# Patient Record
Sex: FEMALE | Race: WHITE | Employment: OTHER | URBAN - METROPOLITAN AREA
[De-identification: names, ages, dates, MRNs, and addresses within clinical notes are randomized per-mention and may not be internally consistent; named-entity substitution may affect disease eponyms.]

---

## 2017-01-16 ENCOUNTER — ANTI-COAG VISIT (OUTPATIENT)
Dept: CARDIOLOGY CLINIC | Age: 82
End: 2017-01-16

## 2017-01-16 DIAGNOSIS — I99.8 ISCHEMIC FINGER: ICD-10-CM

## 2017-01-16 DIAGNOSIS — Z79.01 LONG TERM CURRENT USE OF ANTICOAGULANT THERAPY: Primary | ICD-10-CM

## 2017-01-16 LAB
INR BLD: 2.7
PT POC: 32.3 SEC
VALID INTERNAL CONTROL?: YES

## 2017-01-16 NOTE — PROGRESS NOTES
Verbal order and read back per Dr. Navdeep Wagner  The INR is stable and therapeutic. Continue same dose of coumadin and recheck in 1 month.   Continue Coumadin 5mg daily except 2.5mg on Tues, Thur, and Sat   Patient informed of instructions, read back and verbalized understanding Dandy Dominguez LPN

## 2017-02-16 ENCOUNTER — ANTI-COAG VISIT (OUTPATIENT)
Dept: CARDIOLOGY CLINIC | Age: 82
End: 2017-02-16

## 2017-02-16 DIAGNOSIS — I99.8 ISCHEMIC FINGER: ICD-10-CM

## 2017-02-16 DIAGNOSIS — Z79.01 LONG TERM CURRENT USE OF ANTICOAGULANT THERAPY: Primary | ICD-10-CM

## 2017-02-16 LAB
INR BLD: 2
INR BLD: NORMAL
PT POC: NORMAL SEC
PT POC: NORMAL SEC
VALID INTERNAL CONTROL?: YES
VALID INTERNAL CONTROL?: YES

## 2017-02-16 NOTE — PROGRESS NOTES
Verbal order and read back per Christi Reaves NP  The INR is stable and therapeutic. Continue same dose of coumadin and recheck in 1 month.   Continue Coumadin 5mg daily except 2.5mg on Tues, Thur, and Sat   Patient informed of instructions, read back and verbalized understanding Armand Coats LPN

## 2017-03-16 ENCOUNTER — ANTI-COAG VISIT (OUTPATIENT)
Dept: CARDIOLOGY CLINIC | Age: 82
End: 2017-03-16

## 2017-03-16 DIAGNOSIS — I99.8 ISCHEMIC FINGER: ICD-10-CM

## 2017-03-16 DIAGNOSIS — Z79.01 LONG TERM CURRENT USE OF ANTICOAGULANT THERAPY: Primary | ICD-10-CM

## 2017-03-16 LAB
INR BLD: 5.9
PT POC: 71 SEC
VALID INTERNAL CONTROL?: YES

## 2017-03-16 NOTE — PROGRESS NOTES
Verbal order and read back per Veronica Lopez NP  The INR is above the therapeutic range. Ask the patient about bleeding complications. Please make the following adjustments to Coumadin dosing: Hold X 2 then Continue Coumadin 5mg daily except 2.5mg on Tues, Thur, and Sat Repeat the INR in 1 week. Patient states she may not be able to make appointment next week due to family issue. She will call the office if she has to reschedule.    Patient informed of instructions, read back and verbalized understanding Yael Zhang LPN

## 2017-03-22 ENCOUNTER — ANTI-COAG VISIT (OUTPATIENT)
Dept: CARDIOLOGY CLINIC | Age: 82
End: 2017-03-22

## 2017-03-22 DIAGNOSIS — I99.8 ISCHEMIC FINGER: ICD-10-CM

## 2017-03-22 DIAGNOSIS — Z79.01 LONG TERM CURRENT USE OF ANTICOAGULANT THERAPY: ICD-10-CM

## 2017-03-22 LAB
INR BLD: 2.5
PT POC: 30.5 SEC
VALID INTERNAL CONTROL?: YES

## 2017-03-22 NOTE — PROGRESS NOTES
The INR is stable and therapeutic.    Continue Coumadin 5mg daily except 2.5mg on Tues, Thur, and Sat   Recheck INR in 4 weeks

## 2017-04-19 ENCOUNTER — ANTI-COAG VISIT (OUTPATIENT)
Dept: CARDIOLOGY CLINIC | Age: 82
End: 2017-04-19

## 2017-04-19 DIAGNOSIS — I99.8 ISCHEMIC FINGER: ICD-10-CM

## 2017-04-19 DIAGNOSIS — Z79.01 LONG TERM CURRENT USE OF ANTICOAGULANT THERAPY: ICD-10-CM

## 2017-04-19 LAB
INR BLD: 4.2
PT POC: 50.5 SEC
VALID INTERNAL CONTROL?: YES

## 2017-04-19 NOTE — PROGRESS NOTES
The INR is above the therapeutic range. Ask the patient about bleeding complications.   Please make the following adjustments to Coumadin dosing: Hold x 1 then continue Coumadin 5mg daily except 2.5mg on Tues, Thur, and Sat   Repeat the INR in 4 weeks

## 2017-05-17 ENCOUNTER — ANTI-COAG VISIT (OUTPATIENT)
Dept: CARDIOLOGY CLINIC | Age: 82
End: 2017-05-17

## 2017-05-17 DIAGNOSIS — Z79.01 LONG TERM CURRENT USE OF ANTICOAGULANT THERAPY: ICD-10-CM

## 2017-05-17 DIAGNOSIS — I99.8 ISCHEMIC FINGER: ICD-10-CM

## 2017-05-17 LAB
INR BLD: 2.6
PT POC: 30.9 SECONDS
VALID INTERNAL CONTROL?: YES

## 2017-06-21 ENCOUNTER — ANTI-COAG VISIT (OUTPATIENT)
Dept: CARDIOLOGY CLINIC | Age: 82
End: 2017-06-21

## 2017-06-21 DIAGNOSIS — I99.8 ISCHEMIC FINGER: ICD-10-CM

## 2017-06-21 DIAGNOSIS — Z79.01 LONG TERM CURRENT USE OF ANTICOAGULANT THERAPY: ICD-10-CM

## 2017-06-21 LAB
INR BLD: 2.5
PT POC: 29.9 SECONDS
VALID INTERNAL CONTROL?: YES

## 2017-06-21 NOTE — PROGRESS NOTES
The INR is stable and therapeutic.   Continue Coumadin 5mg daily except 2.5mg on Tues, Thur, and Sat   Recheck INR in 5 weeks

## 2017-07-25 ENCOUNTER — ANTI-COAG VISIT (OUTPATIENT)
Dept: CARDIOLOGY CLINIC | Age: 82
End: 2017-07-25

## 2017-07-25 DIAGNOSIS — I99.8 ISCHEMIC FINGER: Primary | ICD-10-CM

## 2017-07-25 DIAGNOSIS — Z79.01 LONG TERM CURRENT USE OF ANTICOAGULANT THERAPY: ICD-10-CM

## 2017-07-25 LAB
INR BLD: 2.7
PT POC: 32.2 SECONDS
VALID INTERNAL CONTROL?: YES

## 2017-07-25 NOTE — PROGRESS NOTES
Verbal order and read back per Jojo Nix NP  The INR is stable and therapeutic.  Continue same dose of coumadin and recheck in 3 weeks  Continue Coumadin 5mg daily except 2.5mg on Tues, Thur, and Sat   Patient informed of instructions, read back and verbalized understanding Toby Spears LPN

## 2017-08-17 ENCOUNTER — ANTI-COAG VISIT (OUTPATIENT)
Dept: CARDIOLOGY CLINIC | Age: 82
End: 2017-08-17

## 2017-08-17 ENCOUNTER — OFFICE VISIT (OUTPATIENT)
Dept: CARDIOLOGY CLINIC | Age: 82
End: 2017-08-17

## 2017-08-17 VITALS
WEIGHT: 134 LBS | BODY MASS INDEX: 23.74 KG/M2 | OXYGEN SATURATION: 98 % | HEIGHT: 63 IN | HEART RATE: 81 BPM | DIASTOLIC BLOOD PRESSURE: 70 MMHG | SYSTOLIC BLOOD PRESSURE: 120 MMHG

## 2017-08-17 DIAGNOSIS — I99.8 ISCHEMIC FINGER: ICD-10-CM

## 2017-08-17 DIAGNOSIS — Z79.01 LONG TERM CURRENT USE OF ANTICOAGULANT THERAPY: ICD-10-CM

## 2017-08-17 DIAGNOSIS — I10 ESSENTIAL HYPERTENSION WITH GOAL BLOOD PRESSURE LESS THAN 130/80: ICD-10-CM

## 2017-08-17 DIAGNOSIS — Z45.09 ENCOUNTER FOR LOOP RECORDER CHECK: Primary | ICD-10-CM

## 2017-08-17 DIAGNOSIS — Z79.01 LONG TERM CURRENT USE OF ANTICOAGULANT THERAPY: Primary | ICD-10-CM

## 2017-08-17 LAB
INR BLD: 2.5
PT POC: 30.1 SECONDS
VALID INTERNAL CONTROL?: YES

## 2017-08-17 RX ORDER — WARFARIN SODIUM 5 MG/1
5 TABLET ORAL DAILY
Qty: 90 TAB | Refills: 3 | Status: SHIPPED | OUTPATIENT
Start: 2017-08-17 | End: 2018-08-16 | Stop reason: SDUPTHER

## 2017-08-17 NOTE — PROGRESS NOTES
Verbal order and read back per Renetta Rule NP  The INR is stable and therapeutic. Continue same dose of coumadin and recheck in 1 month.   Continue Coumadin 5mg daily except 2.5mg on Tues, Thur, and Sat   Patient informed of instructions, read back and verbalized understanding Cony Venegas LPN

## 2017-08-17 NOTE — PROGRESS NOTES
1. Have you been to the ER, urgent care clinic since your last visit? Hospitalized since your last visit? No     2. Have you seen or consulted any other health care providers outside of the [address] [place] [address] since your last visit? Include any pap smears or colon screening.  No

## 2017-08-17 NOTE — MR AVS SNAPSHOT
Visit Information Date & Time Provider Department Dept. Phone Encounter #  
 8/17/2017 10:00 AM Clinton Herrera MD [facility] [address] [id number] Your Appointments 9/14/2017  9:40 AM  
ANTICOAG NURSE with Pt Inr Hv [facility] ([facility]) Appt Note: 4 week INR  
 Sushila Isidro Getting [id number] [phone] [address] [place] [address] [address] Upcoming Health Maintenance Date Due DTaP/Tdap/Td series (1 - Tdap) 12/6/1956 ZOSTER VACCINE AGE 60> 10/6/1995 GLAUCOMA SCREENING Q2Y 12/6/2000 OSTEOPOROSIS SCREENING (DEXA) 12/6/2000 Pneumococcal 65+ Low/Medium Risk (1 of 2 - PCV13) 12/6/2000 MEDICARE YEARLY EXAM 12/6/2000 INFLUENZA AGE 9 TO ADULT 8/1/2017 Allergies as of 8/17/2017  Review Complete On: 8/17/2017 By: Clinton Herrera MD  
  
 Severity Noted Reaction Type Reactions Iodine Medium 07/08/2015   Systemic Unknown (comments) Gets flushed and hot Codeine  04/24/2015    Other (comments) Gets hyper Current Immunizations  Never Reviewed No immunizations on file. Not reviewed this visit You Were Diagnosed With   
  
 Codes Comments Long term current use of anticoagulant therapy    -  Primary ICD-10-CM: Z79.01 
ICD-9-CM: V58.61 Ischemic finger     ICD-10-CM: I99.8 ICD-9-CM: 459.9 Essential hypertension with goal blood pressure less than 130/80     ICD-10-CM: I10 
ICD-9-CM: 401.9 Encounter for loop recorder check     ICD-10-CM: Z45.09 
ICD-9-CM: V53.39 Vitals BP Pulse Height(growth percentile) Weight(growth percentile) SpO2 BMI  
 120/70 81 5' 3\" (1.6 m) 134 lb (60.8 kg) 98% 23.74 kg/m2 Smoking Status Never Smoker Vitals History BMI and BSA Data Body Mass Index Body Surface Area  
 23.74 kg/m 2 1.64 m 2 Preferred Pharmacy Pharmacy Name Phone [phone], [address] AT [address] [phone] Your Updated Medication List  
  
   
This list is accurate as of: 8/17/17 11:05 AM.  Always use your most recent med list.  
  
  
  
  
 aspirin 81 mg chewable tablet Take 81 mg by mouth daily. hydroCHLOROthiazide 25 mg tablet Commonly known as:  HYDRODIURIL Take 1 Tab by mouth daily. lisinopril 40 mg tablet Commonly known as:  Lollie Jump Take 1 Tab by mouth daily. meclizine 25 mg chewable tablet Commonly known as:  ANTIVERT Take 25 mg by mouth three (3) times daily as needed. warfarin 5 mg tablet Commonly known as:  COUMADIN Take 1 Tab by mouth daily. We Performed the Following AMB POC EKG ROUTINE W/ 12 LEADS, INTER & REP [79712 CPT(R)] Patient Instructions Please follow up in 12 months Introducing [facility]! [facility] introduces Shoot it! patient portal. Now you can access parts of your medical record, email your doctor's office, and request medication refills online. 1. In your internet browser, go to [email or web address] 2. Click on the First Time User? Click Here link in the Sign In box. You will see the New Member Sign Up page. 3. Enter your Shoot it! Access Code exactly as it appears below. You will not need to use this code after youve completed the sign-up process. If you do not sign up before the expiration date, you must request a new code. · Shoot it! Access Code: [email or web address] Expires: 9/19/2017 10:03 AM 
 
4. Enter the last four digits of your Social Security Number (xxxx) and Date of Birth (mm/dd/yyyy) as indicated and click Submit. You will be taken to the next sign-up page. 5. Create a Ynsectt ID. This will be your Ynsectt login ID and cannot be changed, so think of one that is secure and easy to remember. 6. Create a LifeVantage password. You can change your password at any time. 7. Enter your Password Reset Question and Answer. This can be used at a later time if you forget your password. 8. Enter your e-mail address. You will receive e-mail notification when new information is available in [address] 9. Click Sign Up. You can now view and download portions of your medical record. 10. Click the Download Summary menu link to download a portable copy of your medical information. If you have questions, please visit the Frequently Asked Questions section of the LifeVantage website. Remember, LifeVantage is NOT to be used for urgent needs. For medical emergencies, dial 911. Now available from your iPhone and Android! Please provide this summary of care documentation to your next provider. Your primary care clinician is listed as Lupe Lee. If you have any questions after today's visit, please call [phone].

## 2017-09-14 ENCOUNTER — ANTI-COAG VISIT (OUTPATIENT)
Dept: CARDIOLOGY CLINIC | Age: 82
End: 2017-09-14

## 2017-09-14 DIAGNOSIS — I99.8 ISCHEMIC FINGER: ICD-10-CM

## 2017-09-14 DIAGNOSIS — Z79.01 LONG TERM CURRENT USE OF ANTICOAGULANT THERAPY: Primary | ICD-10-CM

## 2017-09-14 LAB
INR BLD: 2.1
PT POC: 25.2 SECONDS
VALID INTERNAL CONTROL?: YES

## 2017-09-14 NOTE — PROGRESS NOTES
Verbal order and read back per Charlotte Kinney NP  The INR is stable and therapeutic. Continue same dose of coumadin and recheck in 1 month.   Continue Coumadin 5mg daily except 2.5mg on Tues, Thur, and Sat   Patient informed of instructions, read back and verbalized understanding Vic Arevalo LPN

## 2017-10-12 ENCOUNTER — ANTI-COAG VISIT (OUTPATIENT)
Dept: CARDIOLOGY CLINIC | Age: 82
End: 2017-10-12

## 2017-10-12 DIAGNOSIS — Z79.01 LONG TERM CURRENT USE OF ANTICOAGULANT THERAPY: ICD-10-CM

## 2017-10-12 DIAGNOSIS — I99.8 ISCHEMIC FINGER: ICD-10-CM

## 2017-10-12 LAB
INR BLD: 2.7
PT POC: 32.1 SECONDS
VALID INTERNAL CONTROL?: YES

## 2017-10-12 NOTE — PROGRESS NOTES
Verbal order and read back per Lucy Stage NP  The INR is stable and therapeutic. Continue same dose of coumadin and recheck in 1 month.   Continue Coumadin 5mg daily except 2.5mg on Tues, Thur, and Sat   Patient informed of instructions, read back and verbalized understanding Isis Marquis LPN

## 2017-11-15 RX ORDER — LISINOPRIL 40 MG/1
TABLET ORAL
Qty: 90 TAB | Refills: 0 | Status: SHIPPED | OUTPATIENT
Start: 2017-11-15 | End: 2018-02-11 | Stop reason: SDUPTHER

## 2017-11-15 RX ORDER — HYDROCHLOROTHIAZIDE 25 MG/1
TABLET ORAL
Qty: 90 TAB | Refills: 0 | Status: SHIPPED | OUTPATIENT
Start: 2017-11-15 | End: 2018-02-11 | Stop reason: SDUPTHER

## 2017-11-16 ENCOUNTER — ANTI-COAG VISIT (OUTPATIENT)
Dept: CARDIOLOGY CLINIC | Age: 82
End: 2017-11-16

## 2017-11-16 DIAGNOSIS — I99.8 ISCHEMIC FINGER: Primary | ICD-10-CM

## 2017-11-16 DIAGNOSIS — Z79.01 LONG TERM CURRENT USE OF ANTICOAGULANT THERAPY: ICD-10-CM

## 2017-11-16 LAB
INR BLD: 3.4
PT POC: 40.7 SECONDS
VALID INTERNAL CONTROL?: YES

## 2017-11-16 NOTE — PROGRESS NOTES
Verbal order and read back per Amada Wright NP  The INR is above the therapeutic range. Ask the patient about bleeding complications. Please make the following adjustments to Coumadin dosing: Hold X 1 then Continue Coumadin 5mg daily except 2.5mg on Tues, Thur, and Sat Repeat the INR in 4 weeks (patient request to come back in 1 month).   Patient informed of instructions, read back and verbalized understanding Thea Cota LPN

## 2017-12-14 ENCOUNTER — ANTI-COAG VISIT (OUTPATIENT)
Dept: CARDIOLOGY CLINIC | Age: 82
End: 2017-12-14

## 2017-12-14 DIAGNOSIS — I99.8 ISCHEMIC FINGER: Primary | ICD-10-CM

## 2017-12-14 DIAGNOSIS — Z79.01 LONG TERM CURRENT USE OF ANTICOAGULANT THERAPY: ICD-10-CM

## 2017-12-14 LAB
INR BLD: 2
PT POC: 23.5 SECONDS
VALID INTERNAL CONTROL?: YES

## 2018-01-03 ENCOUNTER — OFFICE VISIT (OUTPATIENT)
Dept: CARDIOLOGY CLINIC | Age: 83
End: 2018-01-03

## 2018-01-03 DIAGNOSIS — Z45.09 ENCOUNTER FOR LOOP RECORDER CHECK: ICD-10-CM

## 2018-01-03 DIAGNOSIS — I99.8 ISCHEMIC FINGER: Primary | ICD-10-CM

## 2018-01-12 ENCOUNTER — ANTI-COAG VISIT (OUTPATIENT)
Dept: CARDIOLOGY CLINIC | Age: 83
End: 2018-01-12

## 2018-01-12 DIAGNOSIS — I99.8 ISCHEMIC FINGER: ICD-10-CM

## 2018-01-12 DIAGNOSIS — Z79.01 LONG TERM CURRENT USE OF ANTICOAGULANT THERAPY: ICD-10-CM

## 2018-01-12 LAB
INR BLD: 2.5
PT POC: 29.9 SECONDS
VALID INTERNAL CONTROL?: YES

## 2018-01-12 NOTE — PROGRESS NOTES
Pt is in range. patient will continue current medication dosage. patient will be see in 4 weeks. Patient was given calendar and appointment   Patient will be trying powder supplement instead of vegetables, since it is more convenient for her. Patient is eating vegetables now about 3 times a week, she will be replacing one meal from vegetables with powder and will see how it will affect her INR level in 4 weeks.

## 2018-02-09 ENCOUNTER — ANTI-COAG VISIT (OUTPATIENT)
Dept: CARDIOLOGY CLINIC | Age: 83
End: 2018-02-09

## 2018-02-09 ENCOUNTER — TELEPHONE (OUTPATIENT)
Dept: CARDIOLOGY CLINIC | Age: 83
End: 2018-02-09

## 2018-02-09 DIAGNOSIS — I99.8 ISCHEMIC FINGER: Primary | ICD-10-CM

## 2018-02-09 DIAGNOSIS — Z79.01 LONG TERM CURRENT USE OF ANTICOAGULANT THERAPY: ICD-10-CM

## 2018-02-09 LAB
INR BLD: 2.5
PT POC: 29.5 SECONDS
VALID INTERNAL CONTROL?: YES

## 2018-02-09 NOTE — PROGRESS NOTES
Verbal order and read back per Cas De Guzman NP  The INR is stable and therapeutic. Continue same dose of coumadin and recheck in 1 month.   Continue Coumadin 5mg daily except 2.5mg on Tues, Thur, and Sat   Patient informed of instructions, read back and verbalized understanding Telly Viera LPN

## 2018-02-09 NOTE — TELEPHONE ENCOUNTER
Patient is on coumadin and requires a dental extraction. Can patient hold coumadin and for how many days? Should she bridge with Lovenox? Progress Notes      History of Present Illness:   An 81 y.o. female here for follow up. Aravind Never has an  implantable loop recorder. 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  She has a history of hypertension and dyslipidemia.  She is on Coumadin without any bleeding issues.  She denies any chest pain, dyspnea, presyncope, syncope, PND, orthopnea or edema.       Her major issue is worsening vertigo which clearly appears to be positional.  She recently had a balance test and is scheduled to see ENT.     Impression:   Significant vertigo/balance issues, scheduled to see ENT. Loop recorder has not shown arrhythmias to cause these symptoms. 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 Implantable loop record 7/2015 to monitor for arrhythmias and without any events. Hypertension. Dyslipidemia.      The underlying etiology of her embolic event is still unclear but she remains on Coumadin. Given the chance of recurrence and that of stroke,  I would continue it indefinitely. Her device does not show any evidence of atrial fibrillation. She has had some tachy episodes but nothing sustained. I will tentatively see her back in one year.           Past Medical History:   Diagnosis Date    History of echocardiogram 04/25/2015     EF 55-60%. No WMA. Gr 1 DDfx. RVSP 30-35 mmHg. Mild-mod TR. 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       Vertigo

## 2018-02-09 NOTE — LETTER
2018 8:57 AM 
 
Ms. Ericka Ndiaye [address] [place] [id number]  
 1935 Ericka Ndiaye can hold coumadin for 5 days prior to dental extraction . Please feel free to contact our office if you have any questions regarding this patient. Sincerely,       MD Michael Coronel MD

## 2018-02-11 RX ORDER — LISINOPRIL 40 MG/1
TABLET ORAL
Qty: 90 TAB | Refills: 0 | Status: SHIPPED | OUTPATIENT
Start: 2018-02-11 | End: 2018-05-11 | Stop reason: SDUPTHER

## 2018-02-11 RX ORDER — HYDROCHLOROTHIAZIDE 25 MG/1
TABLET ORAL
Qty: 90 TAB | Refills: 0 | Status: SHIPPED | OUTPATIENT
Start: 2018-02-11 | End: 2018-05-11 | Stop reason: SDUPTHER

## 2018-03-07 ENCOUNTER — ANTI-COAG VISIT (OUTPATIENT)
Dept: CARDIOLOGY CLINIC | Age: 83
End: 2018-03-07

## 2018-03-07 DIAGNOSIS — I99.8 ISCHEMIC FINGER: ICD-10-CM

## 2018-03-07 DIAGNOSIS — Z79.01 LONG TERM CURRENT USE OF ANTICOAGULANT THERAPY: ICD-10-CM

## 2018-03-07 LAB
INR BLD: 2.1
PT POC: 25 SECONDS
VALID INTERNAL CONTROL?: YES

## 2018-04-04 ENCOUNTER — ANTI-COAG VISIT (OUTPATIENT)
Dept: CARDIOLOGY CLINIC | Age: 83
End: 2018-04-04

## 2018-04-04 DIAGNOSIS — Z79.01 LONG TERM CURRENT USE OF ANTICOAGULANT THERAPY: ICD-10-CM

## 2018-04-04 DIAGNOSIS — I99.8 ISCHEMIC FINGER: ICD-10-CM

## 2018-04-04 LAB
INR BLD: 3.3
PT POC: 29.8 SECONDS
VALID INTERNAL CONTROL?: YES

## 2018-04-04 NOTE — PROGRESS NOTES
The INR is above the therapeutic range. Ask the patient about bleeding complications.   Please make the following adjustments to Coumadin dosing: Hold today, 2.5mg on 4/6/18 and then Continue Coumadin 5mg daily except 2.5mg on Tues, Thur, and Sat  Repeat the INR in 3 weeks

## 2018-04-11 ENCOUNTER — OFFICE VISIT (OUTPATIENT)
Dept: CARDIOLOGY CLINIC | Age: 83
End: 2018-04-11

## 2018-04-11 DIAGNOSIS — Z45.09 ENCOUNTER FOR LOOP RECORDER CHECK: ICD-10-CM

## 2018-04-11 DIAGNOSIS — I99.8 ISCHEMIC FINGER: Primary | ICD-10-CM

## 2018-04-18 PROBLEM — Z45.09 ENCOUNTER FOR LOOP RECORDER CHECK: Status: ACTIVE | Noted: 2018-04-18

## 2018-04-25 ENCOUNTER — ANTI-COAG VISIT (OUTPATIENT)
Dept: CARDIOLOGY CLINIC | Age: 83
End: 2018-04-25

## 2018-04-25 DIAGNOSIS — Z79.01 LONG TERM CURRENT USE OF ANTICOAGULANT THERAPY: ICD-10-CM

## 2018-04-25 DIAGNOSIS — I99.8 ISCHEMIC FINGER: ICD-10-CM

## 2018-04-25 LAB
INR BLD: 3.3
PT POC: 39.3 SECONDS
VALID INTERNAL CONTROL?: YES

## 2018-04-25 NOTE — PROGRESS NOTES
The INR is above the therapeutic range. Ask the patient about bleeding complications.   Please make the following adjustments to Coumadin dosing: Hold today then Continue Coumadin 5mg daily except 2.5mg on Tues, Thur, and Sat   Repeat the INR in 4 weeks

## 2018-05-12 RX ORDER — LISINOPRIL 40 MG/1
TABLET ORAL
Qty: 90 TAB | Refills: 0 | Status: SHIPPED | OUTPATIENT
Start: 2018-05-12 | End: 2018-08-16 | Stop reason: SDUPTHER

## 2018-05-12 RX ORDER — HYDROCHLOROTHIAZIDE 25 MG/1
TABLET ORAL
Qty: 90 TAB | Refills: 0 | Status: SHIPPED | OUTPATIENT
Start: 2018-05-12 | End: 2018-08-16 | Stop reason: SDUPTHER

## 2018-05-23 ENCOUNTER — ANTI-COAG VISIT (OUTPATIENT)
Dept: CARDIOLOGY CLINIC | Age: 83
End: 2018-05-23

## 2018-05-23 DIAGNOSIS — I99.8 ISCHEMIC FINGER: ICD-10-CM

## 2018-05-23 DIAGNOSIS — Z79.01 LONG TERM CURRENT USE OF ANTICOAGULANT THERAPY: ICD-10-CM

## 2018-05-23 LAB
INR BLD: 1.9
PT POC: 22.5 SECONDS
VALID INTERNAL CONTROL?: YES

## 2018-05-23 RX ORDER — DICLOFENAC SODIUM 10 MG/G
GEL TOPICAL 4 TIMES DAILY
COMMUNITY
End: 2018-11-05 | Stop reason: ALTCHOICE

## 2018-05-23 NOTE — PROGRESS NOTES
The INR is below the therapeutic range.   Please make the following adjustments to Coumadin dosin.5mg x 1 then Continue Coumadin 5mg daily except 2.5mg on Tues, Thur, and Sat   Repeat the INR in 4 weeks

## 2018-06-20 ENCOUNTER — ANTI-COAG VISIT (OUTPATIENT)
Dept: CARDIOLOGY CLINIC | Age: 83
End: 2018-06-20

## 2018-06-20 DIAGNOSIS — Z79.01 LONG TERM CURRENT USE OF ANTICOAGULANT THERAPY: ICD-10-CM

## 2018-06-20 DIAGNOSIS — I99.8 ISCHEMIC FINGER: ICD-10-CM

## 2018-06-20 LAB
INR BLD: 3.8
PT POC: 45.3 SECONDS
VALID INTERNAL CONTROL?: YES

## 2018-07-11 ENCOUNTER — ANTI-COAG VISIT (OUTPATIENT)
Dept: CARDIOLOGY CLINIC | Age: 83
End: 2018-07-11

## 2018-07-11 DIAGNOSIS — Z79.01 LONG TERM CURRENT USE OF ANTICOAGULANT THERAPY: ICD-10-CM

## 2018-07-11 DIAGNOSIS — I99.8 ISCHEMIC FINGER: ICD-10-CM

## 2018-07-11 LAB
INR BLD: 2.1
PT POC: 25 SECONDS
VALID INTERNAL CONTROL?: YES

## 2018-07-11 NOTE — PROGRESS NOTES
Verbal order and read back per  Talia Cota, NP  Patient is within therapeutic range  Continue Coumadin 5mg daily except 2.5mg on Tues, Thur, and Sat   Recheck with seutter appt on 8/16  This has been fully explained to the patient, who indicates understanding.

## 2018-08-16 ENCOUNTER — OFFICE VISIT (OUTPATIENT)
Dept: CARDIOLOGY CLINIC | Age: 83
End: 2018-08-16

## 2018-08-16 ENCOUNTER — ANTI-COAG VISIT (OUTPATIENT)
Dept: CARDIOLOGY CLINIC | Age: 83
End: 2018-08-16

## 2018-08-16 ENCOUNTER — CLINICAL SUPPORT (OUTPATIENT)
Dept: CARDIOLOGY CLINIC | Age: 83
End: 2018-08-16

## 2018-08-16 VITALS
WEIGHT: 132 LBS | HEART RATE: 84 BPM | SYSTOLIC BLOOD PRESSURE: 128 MMHG | OXYGEN SATURATION: 95 % | BODY MASS INDEX: 23.39 KG/M2 | HEIGHT: 63 IN | DIASTOLIC BLOOD PRESSURE: 72 MMHG

## 2018-08-16 DIAGNOSIS — I99.8 ISCHEMIC FINGER: Primary | ICD-10-CM

## 2018-08-16 DIAGNOSIS — I10 ESSENTIAL HYPERTENSION WITH GOAL BLOOD PRESSURE LESS THAN 130/80: ICD-10-CM

## 2018-08-16 DIAGNOSIS — Z79.01 LONG TERM CURRENT USE OF ANTICOAGULANT THERAPY: ICD-10-CM

## 2018-08-16 DIAGNOSIS — Z45.09 ENCOUNTER FOR LOOP RECORDER CHECK: ICD-10-CM

## 2018-08-16 DIAGNOSIS — I99.8 ISCHEMIC FINGER: ICD-10-CM

## 2018-08-16 DIAGNOSIS — Z79.01 LONG TERM CURRENT USE OF ANTICOAGULANT THERAPY: Primary | ICD-10-CM

## 2018-08-16 DIAGNOSIS — Z45.09 ENCOUNTER FOR LOOP RECORDER CHECK: Primary | ICD-10-CM

## 2018-08-16 LAB
INR BLD: 3.4
PT POC: 40.8 SECONDS
VALID INTERNAL CONTROL?: YES

## 2018-08-16 RX ORDER — HYDROCHLOROTHIAZIDE 25 MG/1
25 TABLET ORAL DAILY
Qty: 90 TAB | Refills: 3 | Status: SHIPPED | OUTPATIENT
Start: 2018-08-16 | End: 2019-11-05 | Stop reason: SDUPTHER

## 2018-08-16 RX ORDER — LISINOPRIL 40 MG/1
40 TABLET ORAL DAILY
Qty: 90 TAB | Refills: 3 | Status: SHIPPED | OUTPATIENT
Start: 2018-08-16 | End: 2020-10-30

## 2018-08-16 RX ORDER — WARFARIN SODIUM 5 MG/1
5 TABLET ORAL DAILY
Qty: 90 TAB | Refills: 3 | Status: SHIPPED | OUTPATIENT
Start: 2018-08-16 | End: 2019-11-05 | Stop reason: SDUPTHER

## 2018-08-16 NOTE — PROGRESS NOTES
History of Present Illness:  An 80 y. o. female here for follow up. Nam Clayton has an  implantable loop recorder n place for an embolic event to the right finger a few years ago. She has a history of hypertension and dyslipidemia.  She is on Coumadin without any bleeding issues.  She denies any chest pain, dyspnea, presyncope, syncope, PND, orthopnea or edema.  She has significant issues with vertigo which is being seen and treated by ENT. She continues to have some slight discoloration in her toes at times, but no significant pain.       Impression:   Significant vertigo/balance issues seeing ENT. Implantable Loop recorder placed three years ago now at end of service, no documented arrhythmias, but she tells me 25-30 years ago she did have atrial fibrillation at [facility]. 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 Hypertension, controlled. Dyslipidemia. Her extremities still appear a little purple at times and worse when she hangs her feet down, but no associated pain. I am still concerned about a possible blue toe syndrome from Coumadin and cholesterol emboli,but unusual without any pain. She will continue Coumadin for now. All questions answered. I will schedule her for a loop explant if she would like. Past Medical History:   Diagnosis Date    History of echocardiogram 04/25/2015    EF 55-60%. No WMA. Gr 1 DDfx. RVSP 30-35 mmHg. Mild-mod TR. 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  Vertigo        Current Outpatient Prescriptions   Medication Sig Dispense Refill    diclofenac (VOLTAREN) 1 % gel Apply  to affected area four (4) times daily.  hydroCHLOROthiazide (HYDRODIURIL) 25 mg tablet TAKE 1 TABLET BY MOUTH DAILY. 90 Tab 0    lisinopril (PRINIVIL, ZESTRIL) 40 mg tablet TAKE 1 TABLET BY MOUTH DAILY.  90 Tab 0    warfarin (COUMADIN) 5 mg tablet Take 1 Tab by mouth daily. 90 Tab 3    aspirin 81 mg chewable tablet Take 81 mg by mouth daily.  meclizine (ANTIVERT) 25 mg chewable tablet Take 25 mg by mouth three (3) times daily as needed. Social History   reports that she has never smoked. She does not have any smokeless tobacco history on file. reports that she does not drink alcohol. Family History  family history is not on file. Review of Systems  Except as stated above include:  Constitutional: Negative for fever, chills and malaise/fatigue. HEENT: No congestion or recent URI. Gastrointestinal: No nausea, vomiting, abdominal pain, bloody stools. Pulmonary:  Negative except as stated above. Cardiac:  Negative except as stated above. Musculoskeletal: Negative except as stated above. Neurological:  No localized symptoms. Skin:  Negative except as stated above. Psych:  Negative except as stated above. Endocrine:  Negative except as stated above. PHYSICAL EXAM  BP Readings from Last 3 Encounters:   08/16/18 128/72   08/17/17 120/70   08/19/16 126/84     Pulse Readings from Last 3 Encounters:   08/16/18 84   08/17/17 81   08/19/16 66     Wt Readings from Last 3 Encounters:   08/16/18 59.9 kg (132 lb)   08/17/17 60.8 kg (134 lb)   08/19/16 60.8 kg (134 lb)     General:   Well developed, well groomed. Head/Neck:   No jugular venous distention     No carotid bruits. No evidence of xanthelasma. Lungs:   No respiratory distress. Clear bilaterally. Heart:    Regular rate and rhythm. Normal S1/S2. Palpation of heart with normal point of maximum impulse. No significant murmurs, rubs or gallops. Abdomen:   Soft and nontender. No palpable abdominal mass or bruits. Extremities:   Intact peripheral pulses. Purple toes. Neurological:   Alert and oriented to person, place, time. No focal neurological deficit visually.   Skin:   No obvious rash    Blood Pressure Metric:  Sherine Zimmer has been given the following recommendations today due to her elevated BP reading: controlled

## 2018-08-16 NOTE — MR AVS SNAPSHOT
[address] [address] [address] [address] [phone] Patient: Arline Perkins MRN: [id number] Visit Information Date & Time Provider Department Dept. Phone Encounter #  
 2018  1:00 PM Amanda Sarabia MD [facility] [phone] [id number] Upcoming Health Maintenance Date Due DTaP/Tdap/Td series (1 - Tdap) 1956 ZOSTER VACCINE AGE 60> 10/6/1995 GLAUCOMA SCREENING Q2Y 2000 Bone Densitometry (Dexa) Screening 2000 Pneumococcal 65+ Low/Medium Risk (1 of 2 - PCV13) 2000 MEDICARE YEARLY EXAM 3/14/2018 Influenza Age 5 to Adult 2018 Allergies as of 2018  Review Complete On: 2018 By: Amanda Sarabia MD  
  
 Severity Noted Reaction Type Reactions Iodine Medium 2015   Systemic Unknown (comments) Gets flushed and hot Codeine  2015    Other (comments) Gets hyper Current Immunizations  Never Reviewed No immunizations on file. Not reviewed this visit You Were Diagnosed With   
  
 Codes Comments Encounter for loop recorder check    -  Primary ICD-10-CM: Z45.09 
ICD-9-CM: V53.39 Long term current use of anticoagulant therapy     ICD-10-CM: Z79.01 
ICD-9-CM: V58.61 Essential hypertension with goal blood pressure less than 130/80     ICD-10-CM: I10 
ICD-9-CM: 401.9 Vitals BP Pulse Height(growth percentile) Weight(growth percentile) SpO2 BMI  
 128/72 84 5' 3\" (1.6 m) 132 lb (59.9 kg) 95% 23.38 kg/m2 Smoking Status Never Smoker Vitals History BMI and BSA Data Body Mass Index Body Surface Area  
 23.38 kg/m 2 1.63 m 2 Preferred Pharmacy Pharmacy Name Phone [facility] [phone] - [place], [address] AT [address] [phone] Your Updated Medication List  
  
   
 This list is accurate as of 8/16/18  1:39 PM.  Always use your most recent med list.  
  
  
  
  
 aspirin 81 mg chewable tablet Take 81 mg by mouth daily. diclofenac 1 % Gel Commonly known as:  VOLTAREN Apply  to affected area four (4) times daily. hydroCHLOROthiazide 25 mg tablet Commonly known as:  HYDRODIURIL  
TAKE 1 TABLET BY MOUTH DAILY. lisinopril 40 mg tablet Commonly known as:  PRINIVIL, ZESTRIL  
TAKE 1 TABLET BY MOUTH DAILY. meclizine 25 mg chewable tablet Commonly known as:  ANTIVERT Take 25 mg by mouth three (3) times daily as needed. warfarin 5 mg tablet Commonly known as:  COUMADIN Take 1 Tab by mouth daily. Patient Instructions Hold Coumadin dose today, the continue regular dose recheck in 4 weeks Introducing [facility]! [employer] introduces Gigmax patient portal. Now you can access parts of your medical record, email your doctor's office, and request medication refills online. 1. In your internet browser, go to [email or web address] 2. Click on the First Time User? Click Here link in the Sign In box. You will see the New Member Sign Up page. 3. Enter your Gigmax Access Code exactly as it appears below. You will not need to use this code after youve completed the sign-up process. If you do not sign up before the expiration date, you must request a new code. · Gigmax Access Code: [email or web address] Expires: 11/14/2018  1:29 PM 
 
4. Enter the last four digits of your Social Security Number (xxxx) and Date of Birth (mm/dd/yyyy) as indicated and click Submit. You will be taken to the next sign-up page. 5. Create a Gigmax ID. This will be your Gigmax login ID and cannot be changed, so think of one that is secure and easy to remember. 6. Create a Gigmax password. You can change your password at any time. 7. Enter your Password Reset Question and Answer.  This can be used at a later time if you forget your password. 8. Enter your e-mail address. You will receive e-mail notification when new information is available in [address] 9. Click Sign Up. You can now view and download portions of your medical record. 10. Click the Download Summary menu link to download a portable copy of your medical information. If you have questions, please visit the Frequently Asked Questions section of the SeeFuture website. Remember, SeeFuture is NOT to be used for urgent needs. For medical emergencies, dial 911. Now available from your iPhone and Android! Please provide this summary of care documentation to your next provider. Your primary care clinician is listed as Abby Oppenheim. If you have any questions after today's visit, please call [phone].

## 2018-08-16 NOTE — PROGRESS NOTES
1. Have you been to the ER, urgent care clinic since your last visit? Hospitalized since your last visit? No     2. Have you seen or consulted any other health care providers outside of the [address] [place] [address] since your last visit? Include any pap smears or colon screening.  No

## 2018-08-16 NOTE — PROGRESS NOTES
Verbal order and read back per Leslie Wooten NP  Hold today then Continue Coumadin 5mg daily except 2.5mg on Tues, Thur, and Sat   Patient is not with therapeutic range  Recheck 4 weeks  This has been fully explained to the patient and patient's daughter, who indicates understanding.

## 2018-08-27 NOTE — PROGRESS NOTES
I have personally seen and evaluated the device findings. Interrogation reviewed and I agree with assessment.     Aba Castro

## 2018-09-24 ENCOUNTER — TELEPHONE (OUTPATIENT)
Dept: CARDIOLOGY CLINIC | Age: 83
End: 2018-09-24

## 2018-09-24 ENCOUNTER — ANTI-COAG VISIT (OUTPATIENT)
Dept: CARDIOLOGY CLINIC | Age: 83
End: 2018-09-24

## 2018-09-24 DIAGNOSIS — Z79.01 LONG TERM CURRENT USE OF ANTICOAGULANT THERAPY: ICD-10-CM

## 2018-09-24 DIAGNOSIS — I99.8 ISCHEMIC FINGER: ICD-10-CM

## 2018-09-24 LAB
INR BLD: 2.9
PT POC: 35.3 SECONDS
VALID INTERNAL CONTROL?: YES

## 2018-09-24 NOTE — PROGRESS NOTES
Verbal order and read back per Ximena Carmichael DO  Patient is within therapeutic range   Continue Coumadin 5mg daily except 2.5mg on Tues, Thur, and Sat   Recheck 4 weeks  Patient needs to schedule procedure with Dr Natalie Cowan at that time  This has been fully explained to the patient, who indicates understanding.

## 2018-09-24 NOTE — TELEPHONE ENCOUNTER
Patient is planning on scheduling Loop Recorder explant at next INR visit and may be able to get instructions same day. She is apprehensive about the procedure and would like to know if a rx for valium can be written for her to take prior to procedure. Patient takes coumadin, how many days should she hold it for explant?

## 2018-09-26 NOTE — TELEPHONE ENCOUNTER
Yessica Whitney MD   You 20 hours ago (1:39 PM)                 Minimal bleeding with explant but I think she is worried so can hold for 5 days. thx (Routing comment)                        You  Yessica Whitney MD 21 hours ago (12:10 PM)                   What about the coumadin hold? I know it is usually 5 days for a procedure, would this timeframe apply to her as well? (Routing comment)                        Yessica Whitney MD   You Yesterday (7:59 AM)                   Sorry for confusion, can use valium or xanax, not both. (Routing comment)                        Yessica Whitney MD   You Yesterday (7:59 AM)                   Can use valium as well. thx (Routing comment)                        Yessica Whitney MD   You Yesterday (7:58 AM)                   Yes, can give her a Rx for xanax to take the morning of the procedure, just no driving when taking.  thx (Routing comment)

## 2018-10-22 ENCOUNTER — ANTI-COAG VISIT (OUTPATIENT)
Dept: CARDIOLOGY CLINIC | Age: 83
End: 2018-10-22

## 2018-10-22 DIAGNOSIS — I99.8 ISCHEMIC FINGER: ICD-10-CM

## 2018-10-22 DIAGNOSIS — Z79.01 LONG TERM CURRENT USE OF ANTICOAGULANT THERAPY: Primary | ICD-10-CM

## 2018-10-22 LAB
INR BLD: 2.4
PT POC: 28.4 SECONDS
VALID INTERNAL CONTROL?: YES

## 2018-10-22 NOTE — PROGRESS NOTES
Verbal order and read back per La Colmenares NP  Patient is within therapeutic range  Continue Coumadin 5mg daily except 2.5mg on Tues, Thur, and Sat  Patient will be holding coumadin on 11/5-11/7 due to loop explant   This has been fully explained to the patient, who indicates understanding.

## 2018-11-04 NOTE — PROGRESS NOTES
Fawad Soto presents today for a pre-procedure history and physical for loop recorder explant. 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 It is now at end of service. There were no documented arrhythmias while the device was in place. She is an 80year old female with history of embolic event, hypertension, dyslipidemia, significant vertigo (followed by ENT), and she is anticoagulated with Coumadin. She also has history of some discoloration of her toes at times, but no pain. She was last seen by Dr. Pantera Blunt on 8/14/18. Her last echo was done in April 2015 and it showed an EF of 55-60%, no wall motion abnormalities, grade 1 diastolic dysfunction, and RVSP 30 mmHg. She feels well and is slightly anxious about the procedure. Denies chest pain, tightness, heaviness, and palpitations. Denies shortness of breath at rest, dyspnea on exertion, orthopnea and PND. Denies abdominal bloating. Denies lightheadedness, dizziness, and syncope. Denies lower extremity edema and claudication. Denies nausea, vomiting, diarrhea, melena, hematochezia. Denies hematuria, urgency, frequency. Denies fever, chills. PMH:  Past Medical History:   Diagnosis Date    History of echocardiogram 04/25/2015    EF 55-60%. No WMA. Gr 1 DDfx. RVSP 30-35 mmHg. Mild-mod TR. 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  Vertigo        PSH:  No past surgical history on file. MEDS:  Current Outpatient Medications   Medication Sig    hydroCHLOROthiazide (HYDRODIURIL) 25 mg tablet Take 1 Tab by mouth daily.  lisinopril (PRINIVIL, ZESTRIL) 40 mg tablet Take 1 Tab by mouth daily.  warfarin (COUMADIN) 5 mg tablet Take 1 Tab by mouth daily.  aspirin 81 mg chewable tablet Take 81 mg by mouth daily.     meclizine (ANTIVERT) 25 mg chewable tablet Take 25 mg by mouth three (3) times daily as needed.  diazePAM (VALIUM) 5 mg tablet 1 tablet 1 hour prior to procedure. No current facility-administered medications for this visit. Allergies and Sensitivities:  Allergies   Allergen Reactions    Iodine Unknown (comments)     Gets flushed and hot    Codeine Other (comments)     Gets hyper       Family History:  No family history on file. Social History:  She  reports that  has never smoked. She does not have any smokeless tobacco history on file. She  reports that she does not drink alcohol. Physical:  Visit Vitals  /84   Pulse 96   Ht 5' 3\" (1.6 m)   Wt 59.9 kg (132 lb)   SpO2 96%   BMI 23.38 kg/m²         Exam:  Neck:  Supple, no JVD, no carotid bruits  CV:  Normal S1 and  S2, no murmurs, rubs, or gallops noted  Lungs:  Clear to ausculation throughout, no wheezes or rales  Abd:  Soft, non-tender, non-distended with good bowel sounds. No hepatosplenomegaly  Extremities:  No edema      Data:  EKG:   Read by Zia Pitst MD - Sinus rhythm.  Anteroseptal infarct, age undetermined      LABS:  Lab Results   Component Value Date/Time    Sodium 138 07/01/2015 11:52 AM    Potassium 4.3 07/01/2015 11:52 AM    Chloride 101 07/01/2015 11:52 AM    CO2 30 07/01/2015 11:52 AM    Glucose 111 (H) 07/01/2015 11:52 AM    BUN 16 07/01/2015 11:52 AM    Creatinine 1.08 07/01/2015 11:52 AM     Lab Results   Component Value Date/Time    Cholesterol, total 185 04/25/2015 03:10 AM    HDL Cholesterol 60 04/25/2015 03:10 AM    LDL, calculated 104.8 (H) 04/25/2015 03:10 AM    Triglyceride 101 04/25/2015 03:10 AM    CHOL/HDL Ratio 3.1 04/25/2015 03:10 AM     Lab Results   Component Value Date/Time    ALT (SGPT) 35 07/01/2015 11:52 AM         Impression/Plan:  1. Essential hypertension, blood pressure stable  2. Dyslipidemia  3.  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   [employer] Loop recorder, now due to be explanted. Mrs. Cindi Agudelo was seen today for a pre-procedure history and physical.  She is scheduled for a loop recorder explant on 11/7/18, to be performed by Dr. Pantera Blunt. The procedure was discussed with her and her daughter. She is somewhat anxious about the procedure and per Dr. Pantera Blunt, it is okay for her to have a dose of Valium pre-procedure. Valium  5mg for 1 dose prescribed and signed by Dr. Keon Bacon. Pre-procedure labs to be done today (requisition for CBC, CMP, and PT/INR given). She states that they have received pre-procedure instructions. She will return for her PT/INR after restarting the Coumadin post-explant. She will follow-up with Dr. Pantera Blunt as scheduled and as needed. Thompson Melo MSN, FNP-BC    Please note:  Portions of this chart were created with Dragon medical speech to text program.  Unrecognized errors may be present.

## 2018-11-05 ENCOUNTER — HOSPITAL ENCOUNTER (OUTPATIENT)
Dept: LAB | Age: 83
Discharge: HOME | End: 2018-11-05
Payer: MEDICARE

## 2018-11-05 ENCOUNTER — OFFICE VISIT (OUTPATIENT)
Dept: CARDIOLOGY CLINIC | Age: 83
End: 2018-11-05

## 2018-11-05 VITALS
WEIGHT: 132 LBS | SYSTOLIC BLOOD PRESSURE: 142 MMHG | HEIGHT: 63 IN | HEART RATE: 96 BPM | BODY MASS INDEX: 23.39 KG/M2 | DIASTOLIC BLOOD PRESSURE: 84 MMHG | OXYGEN SATURATION: 96 %

## 2018-11-05 DIAGNOSIS — Z01.812 PRE-PROCEDURE LAB EXAM: ICD-10-CM

## 2018-11-05 DIAGNOSIS — R07.9 CHEST PAIN, UNSPECIFIED TYPE: ICD-10-CM

## 2018-11-05 DIAGNOSIS — I10 ESSENTIAL HYPERTENSION: ICD-10-CM

## 2018-11-05 DIAGNOSIS — I10 ESSENTIAL HYPERTENSION: Primary | ICD-10-CM

## 2018-11-05 DIAGNOSIS — F41.9 ANXIETY: Primary | ICD-10-CM

## 2018-11-05 DIAGNOSIS — Z45.09 ENCOUNTER FOR LOOP RECORDER CHECK: ICD-10-CM

## 2018-11-05 LAB
ALBUMIN SERPL-MCNC: 3.7 G/DL (ref 3.4–5)
ALBUMIN/GLOB SERPL: 1.1 {RATIO} (ref 0.8–1.7)
ALP SERPL-CCNC: 254 U/L (ref 45–117)
ALT SERPL-CCNC: 24 U/L (ref 13–56)
ANION GAP SERPL CALC-SCNC: 7 MMOL/L (ref 3–18)
AST SERPL-CCNC: 30 U/L (ref 15–37)
BASOPHILS # BLD: 0.1 K/UL (ref 0–0.1)
BASOPHILS NFR BLD: 1 % (ref 0–2)
BILIRUB SERPL-MCNC: 0.8 MG/DL (ref 0.2–1)
BUN SERPL-MCNC: 10 MG/DL (ref 7–18)
BUN/CREAT SERPL: 12 (ref 12–20)
CALCIUM SERPL-MCNC: 8.6 MG/DL (ref 8.5–10.1)
CHLORIDE SERPL-SCNC: 104 MMOL/L (ref 100–108)
CO2 SERPL-SCNC: 29 MMOL/L (ref 21–32)
CREAT SERPL-MCNC: 0.82 MG/DL (ref 0.6–1.3)
DIFFERENTIAL METHOD BLD: ABNORMAL
EOSINOPHIL # BLD: 0.4 K/UL (ref 0–0.4)
EOSINOPHIL NFR BLD: 4 % (ref 0–5)
ERYTHROCYTE [DISTWIDTH] IN BLOOD BY AUTOMATED COUNT: 14.7 % (ref 11.6–14.5)
GLOBULIN SER CALC-MCNC: 3.4 G/DL (ref 2–4)
GLUCOSE SERPL-MCNC: 105 MG/DL (ref 74–99)
HCT VFR BLD AUTO: 37.8 % (ref 35–45)
HGB BLD-MCNC: 12 G/DL (ref 12–16)
INR PPP: 2.3 (ref 0.8–1.2)
LYMPHOCYTES # BLD: 3.3 K/UL (ref 0.9–3.6)
LYMPHOCYTES NFR BLD: 32 % (ref 21–52)
MCH RBC QN AUTO: 26.4 PG (ref 24–34)
MCHC RBC AUTO-ENTMCNC: 31.7 G/DL (ref 31–37)
MCV RBC AUTO: 83.1 FL (ref 74–97)
MONOCYTES # BLD: 1.1 K/UL (ref 0.05–1.2)
MONOCYTES NFR BLD: 11 % (ref 3–10)
NEUTS SEG # BLD: 5.4 K/UL (ref 1.8–8)
NEUTS SEG NFR BLD: 52 % (ref 40–73)
PLATELET # BLD AUTO: 390 K/UL (ref 135–420)
PMV BLD AUTO: 9.1 FL (ref 9.2–11.8)
POTASSIUM SERPL-SCNC: 4 MMOL/L (ref 3.5–5.5)
PROT SERPL-MCNC: 7.1 G/DL (ref 6.4–8.2)
PROTHROMBIN TIME: 24.9 SEC (ref 11.5–15.2)
RBC # BLD AUTO: 4.55 M/UL (ref 4.2–5.3)
SODIUM SERPL-SCNC: 140 MMOL/L (ref 136–145)
WBC # BLD AUTO: 10.3 K/UL (ref 4.6–13.2)

## 2018-11-05 PROCEDURE — 85025 COMPLETE CBC W/AUTO DIFF WBC: CPT | Performed by: NURSE PRACTITIONER

## 2018-11-05 PROCEDURE — 36415 COLL VENOUS BLD VENIPUNCTURE: CPT | Performed by: NURSE PRACTITIONER

## 2018-11-05 PROCEDURE — 85610 PROTHROMBIN TIME: CPT | Performed by: NURSE PRACTITIONER

## 2018-11-05 PROCEDURE — 80053 COMPREHEN METABOLIC PANEL: CPT | Performed by: NURSE PRACTITIONER

## 2018-11-05 RX ORDER — DIAZEPAM 5 MG/1
TABLET ORAL
Qty: 1 TAB | Refills: 0 | Status: SHIPPED | OUTPATIENT
Start: 2018-11-05

## 2018-11-05 NOTE — PATIENT INSTRUCTIONS
Pre-procedure labs  Hold Coumadin as instructed for loop recorder explant  Loop recorder explant on 11/7/18. Restart Coumadin as directed and return for PT/INR as scheduled  5 mg tablet of Valium one hour prior to procedure.

## 2018-11-07 ENCOUNTER — HOSPITAL ENCOUNTER (OUTPATIENT)
Age: 83
Setting detail: OUTPATIENT SURGERY
Discharge: HOME | End: 2018-11-07
Attending: INTERNAL MEDICINE | Admitting: INTERNAL MEDICINE
Payer: MEDICARE

## 2018-11-07 VITALS
WEIGHT: 132 LBS | OXYGEN SATURATION: 98 % | SYSTOLIC BLOOD PRESSURE: 142 MMHG | DIASTOLIC BLOOD PRESSURE: 76 MMHG | RESPIRATION RATE: 15 BRPM | HEART RATE: 70 BPM | BODY MASS INDEX: 23.39 KG/M2 | HEIGHT: 63 IN

## 2018-11-07 DIAGNOSIS — Z45.09 ENCOUNTER FOR INTERROGATION OF CARDIAC RECORDER: ICD-10-CM

## 2018-11-07 LAB — INR BLD: 1.6 (ref 0.9–1.2)

## 2018-11-07 PROCEDURE — 33284 HC RMVL PT CARD EVENT RECORD: CPT | Performed by: INTERNAL MEDICINE

## 2018-11-07 PROCEDURE — 77030002933 HC SUT MCRYL J&J -A: Performed by: INTERNAL MEDICINE

## 2018-11-07 PROCEDURE — 85610 PROTHROMBIN TIME: CPT

## 2018-11-07 PROCEDURE — 74011000250 HC RX REV CODE- 250: Performed by: INTERNAL MEDICINE

## 2018-11-07 RX ORDER — LIDOCAINE HYDROCHLORIDE AND EPINEPHRINE 10; 10 MG/ML; UG/ML
INJECTION, SOLUTION INFILTRATION; PERINEURAL AS NEEDED
Status: DISCONTINUED | OUTPATIENT
Start: 2018-11-07 | End: 2018-11-07 | Stop reason: HOSPADM

## 2018-11-07 NOTE — H&P
Plan subcutaneous loop recorder explant. All risks, benefits, alternatives discussed. Plan moderate sedation if anesthesia not available. Risks included but not limited to pain, infection, bleeding, deep venous thrombosis with chronic swelling of arm, anesthesia reactions, pneumothorax, hemothorax, emergent open heart surgery, and death. All questions answered. History of Present Illness:  An 80 y. o. female here for follow up. [facility] has an  implantable loop recorder n place for an embolic event to the right finger a few years ago. She has a history of hypertension and dyslipidemia.  She is on Coumadin without any bleeding issues.  She denies any chest pain, dyspnea, presyncope, syncope, PND, orthopnea or edema.  She has significant issues with vertigo which is being seen and treated by ENT. She continues to have some slight discoloration in her toes at times, but no significant pain.       Impression:   Significant vertigo/balance issues seeing ENT. Implantable Loop recorder placed three years ago now at end of service, no documented arrhythmias, but she tells me 25-30 years ago she did have atrial fibrillation at [facility]. 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 Hypertension, controlled. Dyslipidemia.      Her extremities still appear a little purple at times and worse when she hangs her feet down, but no associated pain. I am still concerned about a possible blue toe syndrome from Coumadin and cholesterol emboli,but unusual without any pain. She will continue Coumadin for now. All questions answered. I will schedule her for a loop explant if she would like.             Past Medical History:   Diagnosis Date    History of echocardiogram 04/25/2015     EF 55-60%. No WMA. Gr 1 DDfx. RVSP 30-35 mmHg. Mild-mod TR.        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  Vertigo           Current Outpatient Prescriptions   Medication Sig Dispense Refill    diclofenac (VOLTAREN) 1 % gel Apply  to affected area four (4) times daily.        hydroCHLOROthiazide (HYDRODIURIL) 25 mg tablet TAKE 1 TABLET BY MOUTH DAILY. 90 Tab 0    lisinopril (PRINIVIL, ZESTRIL) 40 mg tablet TAKE 1 TABLET BY MOUTH DAILY. 90 Tab 0    warfarin (COUMADIN) 5 mg tablet Take 1 Tab by mouth daily. 90 Tab 3    aspirin 81 mg chewable tablet Take 81 mg by mouth daily.        meclizine (ANTIVERT) 25 mg chewable tablet Take 25 mg by mouth three (3) times daily as needed.             Social History   reports that she has never smoked. She does not have any smokeless tobacco history on file. reports that she does not drink alcohol.     Family History  family history is not on file.     Review of Systems  Except as stated above include:  Constitutional: Negative for fever, chills and malaise/fatigue. HEENT: No congestion or recent URI. Gastrointestinal: No nausea, vomiting, abdominal pain, bloody stools. Pulmonary:  Negative except as stated above. Cardiac:  Negative except as stated above. Musculoskeletal: Negative except as stated above. Neurological:  No localized symptoms. Skin:  Negative except as stated above. Psych:  Negative except as stated above. Endocrine:  Negative except as stated above.     PHYSICAL EXAM      BP Readings from Last 3 Encounters:   08/16/18 128/72   08/17/17 120/70   08/19/16 126/84          Pulse Readings from Last 3 Encounters:   08/16/18 84   08/17/17 81   08/19/16 66          Wt Readings from Last 3 Encounters:   08/16/18 59.9 kg (132 lb)   08/17/17 60.8 kg (134 lb)   08/19/16 60.8 kg (134 lb)      General:          Well developed, well groomed. Head/Neck:     No jugular venous distention                           No carotid bruits. No evidence of xanthelasma. Lungs:             No respiratory distress. Clear bilaterally. Heart:                          Regular rate and rhythm. Normal S1/S2. Palpation of heart with normal point of maximum impulse. No significant murmurs, rubs or gallops. Abdomen:        Soft and nontender. No palpable abdominal mass or bruits. Extremities:     Intact peripheral pulses. Purple toes. Neurological:   Alert and oriented to person, place, time. No focal neurological deficit visually.   Skin:                No obvious rash     Blood Pressure Metric:  Cristal Mckinney has been given the following recommendations today due to her elevated BP reading: controlled             Electronically signed by Vola Hamman, MD at 08/16/18 [id number]

## 2018-11-07 NOTE — DISCHARGE INSTRUCTIONS
Remove dressing after 24 hours, then ok to shower. DISCHARGE SUMMARY from Nurse    PATIENT INSTRUCTIONS:    Report the following to your surgeon:  · Excessive pain, swelling, redness or odor of or around the surgical area  · Temperature over 100.5  · Nausea and vomiting lasting longer than 4 hours or if unable to take medications  · Any signs of decreased circulation or nerve impairment to extremity: change in color, persistent  numbness, tingling, coldness or increase pain  · Any questions      *  Please give a list of your current medications to your Primary Care Provider. *  Please update this list whenever your medications are discontinued, doses are      changed, or new medications (including over-the-counter products) are added. *  Please carry medication information at all times in case of emergency situations. These are general instructions for a healthy lifestyle:    No smoking/ No tobacco products/ Avoid exposure to second hand smoke  Surgeon General's Warning:  Quitting smoking now greatly reduces serious risk to your health. Obesity, smoking, and sedentary lifestyle greatly increases your risk for illness    A healthy diet, regular physical exercise & weight monitoring are important for maintaining a healthy lifestyle    You may be retaining fluid if you have a history of heart failure or if you experience any of the following symptoms:  Weight gain of 3 pounds or more overnight or 5 pounds in a week, increased swelling in our hands or feet or shortness of breath while lying flat in bed. Please call your doctor as soon as you notice any of these symptoms; do not wait until your next office visit. Recognize signs and symptoms of STROKE:    F-face looks uneven    A-arms unable to move or move unevenly    S-speech slurred or non-existent    T-time-call 911 as soon as signs and symptoms begin-DO NOT go       Back to bed or wait to see if you get better-TIME IS BRAIN.     Warning Signs of HEART ATTACK     Call 911 if you have these symptoms:   Chest discomfort. Most heart attacks involve discomfort in the center of the chest that lasts more than a few minutes, or that goes away and comes back. It can feel like uncomfortable pressure, squeezing, fullness, or pain.  Discomfort in other areas of the upper body. Symptoms can include pain or discomfort in one or both arms, the back, neck, jaw, or stomach.  Shortness of breath with or without chest discomfort.  Other signs may include breaking out in a cold sweat, nausea, or lightheadedness. Don't wait more than five minutes to call 911 - MINUTES MATTER! Fast action can save your life. Calling 911 is almost always the fastest way to get lifesaving treatment. Emergency Medical Services staff can begin treatment when they arrive -- up to an hour sooner than if someone gets to the hospital by car. The discharge information has been reviewed with the patient and caregiver. The patient and caregiver verbalized understanding. Discharge medications reviewed with the patient and caregiver and appropriate educational materials and side effects teaching were provided.   ___________________________________________________________________________________________________________________________________

## 2018-11-07 NOTE — Clinical Note
TRANSFER - IN REPORT:  
 
Verbal report received from: lizbeth chawla RN. Report consisted of patient's Situation, Background, Assessment and  
Recommendations(SBAR). Opportunity for questions and clarification was provided. Assessment completed upon patient's arrival to unit and care assumed. Patient transported with a Cardiac Cath Tech / Patient Care Tech.

## 2018-11-07 NOTE — Clinical Note
TRANSFER - OUT REPORT:  
 
Verbal report given to: lizbeth chawla RN. Report consisted of patient's Situation, Background, Assessment and  
Recommendations(SBAR). Opportunity for questions and clarification was provided. Patient transported with a Cardiac Cath Tech / Patient Care Tech. Patient transported to: [address].

## 2018-11-19 ENCOUNTER — ANTI-COAG VISIT (OUTPATIENT)
Dept: CARDIOLOGY CLINIC | Age: 83
End: 2018-11-19

## 2018-11-19 DIAGNOSIS — I99.8 ISCHEMIC FINGER: ICD-10-CM

## 2018-11-19 DIAGNOSIS — Z79.01 LONG TERM CURRENT USE OF ANTICOAGULANT THERAPY: Primary | ICD-10-CM

## 2018-11-19 LAB
INR BLD: 2.2
PT POC: 26.4 SECONDS
VALID INTERNAL CONTROL?: YES

## 2018-11-19 NOTE — PROGRESS NOTES
Verbal order and read back per Cassie Callejas NP  Patient is within therapeutic range  Continue Coumadin 5mg daily except 2.5mg on Tues, Thur, and Sat   Recheck 4 weeks  This has been fully explained to the patient, who indicates understanding.

## 2018-12-17 ENCOUNTER — ANTI-COAG VISIT (OUTPATIENT)
Dept: CARDIOLOGY CLINIC | Age: 83
End: 2018-12-17

## 2018-12-17 DIAGNOSIS — Z79.01 LONG TERM CURRENT USE OF ANTICOAGULANT THERAPY: ICD-10-CM

## 2018-12-17 DIAGNOSIS — I99.8 ISCHEMIC FINGER: Primary | ICD-10-CM

## 2018-12-17 LAB
INR BLD: 2
INR BLD: NORMAL
PT POC: 23.8 SECONDS
PT POC: NORMAL SECONDS
VALID INTERNAL CONTROL?: YES
VALID INTERNAL CONTROL?: YES

## 2018-12-17 NOTE — PROGRESS NOTES
Verbal order and read back per PT INR [facility]  The INR is stable and therapeutic. Continue same dose of coumadin (Continue Coumadin 5mg daily except 2.5mg on Tues, Thur, and Sat)  Recheck in 1 month.

## 2019-01-14 ENCOUNTER — ANTI-COAG VISIT (OUTPATIENT)
Dept: CARDIOLOGY CLINIC | Age: 84
End: 2019-01-14

## 2019-01-14 DIAGNOSIS — Z79.01 LONG TERM CURRENT USE OF ANTICOAGULANT THERAPY: Primary | ICD-10-CM

## 2019-01-14 DIAGNOSIS — I99.8 ISCHEMIC FINGER: ICD-10-CM

## 2019-01-14 LAB
INR BLD: 2.7
PT POC: 31.8 SECONDS
VALID INTERNAL CONTROL?: YES

## 2019-02-11 ENCOUNTER — ANTI-COAG VISIT (OUTPATIENT)
Dept: CARDIOLOGY CLINIC | Age: 84
End: 2019-02-11

## 2019-02-11 DIAGNOSIS — Z79.01 LONG TERM CURRENT USE OF ANTICOAGULANT THERAPY: Primary | ICD-10-CM

## 2019-02-11 DIAGNOSIS — I99.8 ISCHEMIC FINGER: ICD-10-CM

## 2019-02-11 LAB
INR BLD: 2.6
PT POC: 30.8 SECONDS
VALID INTERNAL CONTROL?: YES

## 2019-03-11 ENCOUNTER — ANTI-COAG VISIT (OUTPATIENT)
Dept: CARDIOLOGY CLINIC | Age: 84
End: 2019-03-11

## 2019-03-11 DIAGNOSIS — I99.8 ISCHEMIC FINGER: ICD-10-CM

## 2019-03-11 DIAGNOSIS — Z79.01 LONG TERM CURRENT USE OF ANTICOAGULANT THERAPY: ICD-10-CM

## 2019-03-11 LAB
INR BLD: 2.8
INR, EXTERNAL: 2.8
PT POC: NORMAL SECONDS
VALID INTERNAL CONTROL?: YES

## 2019-04-08 ENCOUNTER — ANTI-COAG VISIT (OUTPATIENT)
Dept: CARDIOLOGY CLINIC | Age: 84
End: 2019-04-08

## 2019-04-08 DIAGNOSIS — I99.8 ISCHEMIC FINGER: ICD-10-CM

## 2019-04-08 DIAGNOSIS — Z79.01 LONG TERM CURRENT USE OF ANTICOAGULANT THERAPY: Primary | ICD-10-CM

## 2019-04-08 LAB
INR BLD: 2.2
PT POC: 26.1 SECONDS
VALID INTERNAL CONTROL?: YES

## 2019-04-08 NOTE — PROGRESS NOTES
Verbal order and read back per PT INR [facility]  Patient is within therapeutic range  Continue Coumadin 5mg daily except 2.5mg on Tues, Thur, and Sat  Patient req 5 weeks she will be out of town  This has been fully explained to the patient, who indicates understanding.

## 2019-05-13 RX ORDER — LISINOPRIL 40 MG/1
TABLET ORAL
Qty: 90 TAB | Refills: 0 | Status: SHIPPED | OUTPATIENT
Start: 2019-05-13 | End: 2019-08-07 | Stop reason: SDUPTHER

## 2019-05-15 ENCOUNTER — ANTI-COAG VISIT (OUTPATIENT)
Dept: CARDIOLOGY CLINIC | Age: 84
End: 2019-05-15

## 2019-05-15 DIAGNOSIS — Z79.01 LONG TERM CURRENT USE OF ANTICOAGULANT THERAPY: ICD-10-CM

## 2019-05-15 DIAGNOSIS — I99.8 ISCHEMIC FINGER: ICD-10-CM

## 2019-05-15 LAB
INR BLD: 2.6
PT POC: 30.9 SECONDS
VALID INTERNAL CONTROL?: YES

## 2019-06-12 ENCOUNTER — ANTI-COAG VISIT (OUTPATIENT)
Dept: CARDIOLOGY CLINIC | Age: 84
End: 2019-06-12

## 2019-06-12 DIAGNOSIS — I99.8 ISCHEMIC FINGER: ICD-10-CM

## 2019-06-12 DIAGNOSIS — Z79.01 LONG TERM CURRENT USE OF ANTICOAGULANT THERAPY: ICD-10-CM

## 2019-06-12 LAB
INR BLD: 2.2
PT POC: 26 SECONDS
VALID INTERNAL CONTROL?: YES

## 2019-07-10 ENCOUNTER — ANTI-COAG VISIT (OUTPATIENT)
Dept: CARDIOLOGY CLINIC | Age: 84
End: 2019-07-10

## 2019-07-10 DIAGNOSIS — I99.8 ISCHEMIC FINGER: ICD-10-CM

## 2019-07-10 DIAGNOSIS — Z79.01 LONG TERM CURRENT USE OF ANTICOAGULANT THERAPY: ICD-10-CM

## 2019-07-10 LAB
INR BLD: 2.3
PT POC: 28 SECONDS
VALID INTERNAL CONTROL?: YES

## 2019-08-07 RX ORDER — LISINOPRIL 40 MG/1
TABLET ORAL
Qty: 90 TAB | Refills: 0 | Status: SHIPPED | OUTPATIENT
Start: 2019-08-07 | End: 2019-09-05 | Stop reason: SDUPTHER

## 2019-08-14 ENCOUNTER — ANTI-COAG VISIT (OUTPATIENT)
Dept: CARDIOLOGY CLINIC | Age: 84
End: 2019-08-14

## 2019-08-14 DIAGNOSIS — Z79.01 LONG TERM CURRENT USE OF ANTICOAGULANT THERAPY: ICD-10-CM

## 2019-08-14 DIAGNOSIS — I99.8 ISCHEMIC FINGER: ICD-10-CM

## 2019-08-14 LAB
INR BLD: 2.1
PT POC: 25.7 SECONDS
VALID INTERNAL CONTROL?: YES

## 2019-08-14 NOTE — PROGRESS NOTES
The INR is stable and therapeutic. Continue Coumadin 5mg daily except 2.5mg on Tues, Thur, and Sat   The INR is stable and therapeutic.  Continue same dose of coumadin and recheck in 3 weeks with appt

## 2019-09-05 ENCOUNTER — ANTI-COAG VISIT (OUTPATIENT)
Dept: CARDIOLOGY CLINIC | Age: 84
End: 2019-09-05

## 2019-09-05 ENCOUNTER — OFFICE VISIT (OUTPATIENT)
Dept: CARDIOLOGY CLINIC | Age: 84
End: 2019-09-05

## 2019-09-05 VITALS
WEIGHT: 127 LBS | HEART RATE: 70 BPM | DIASTOLIC BLOOD PRESSURE: 70 MMHG | HEIGHT: 63 IN | BODY MASS INDEX: 22.5 KG/M2 | OXYGEN SATURATION: 97 % | SYSTOLIC BLOOD PRESSURE: 130 MMHG

## 2019-09-05 DIAGNOSIS — I10 ESSENTIAL HYPERTENSION: ICD-10-CM

## 2019-09-05 DIAGNOSIS — Z79.01 LONG TERM CURRENT USE OF ANTICOAGULANT THERAPY: ICD-10-CM

## 2019-09-05 DIAGNOSIS — I99.8 ISCHEMIC FINGER: ICD-10-CM

## 2019-09-05 DIAGNOSIS — Z79.01 LONG TERM CURRENT USE OF ANTICOAGULANT THERAPY: Primary | ICD-10-CM

## 2019-09-05 DIAGNOSIS — R07.9 CHEST PAIN, UNSPECIFIED TYPE: Primary | ICD-10-CM

## 2019-09-05 LAB
INR BLD: 2.3
PT POC: 27.1 SECONDS
VALID INTERNAL CONTROL?: YES

## 2019-09-05 NOTE — PATIENT INSTRUCTIONS
Verbal order and read back per Nia Vieira NP  The INR is stable and therapeutic. Continue same dose of coumadin (Continue Coumadin 5mg daily except 2.5mg on Tues, Thur, and Sat )  Recheck in 1 month.

## 2019-09-05 NOTE — PROGRESS NOTES
Verbal order and read back per Mumtaz Butcher NP  The INR is stable and therapeutic. Continue same dose of coumadin (Continue Coumadin 5mg daily except 2.5mg on Tues, Thur, and Sat )  Recheck in 1 month.

## 2019-09-05 NOTE — PROGRESS NOTES
HPI: An 80-year old female here for follow up. Overall, she is doing well. I explanted her subcutaneous loop recorder November 2018. She is on coumadin without bleeding issues although she does have some skin discoloration which is unchanged. She has a history of recurrent vertigo and seen by ENT. No new chest pain, dyspnea, orthopnea, PND, edema. Impression/Plan:  1. History of recurrent vertigo and balance issues seen by ENT, stable. 2. Implantable loop recorder status post explant November 2018, no documented arrhythmias but she does have a history of atrial fibrillation 25 years ago at [facility]. 3. 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 4. Hypertension controlled. 5. Dyslipidemia. She has chronic discoloration unchanged. She is on coumadin as well as aspirin long term. She is doing well and I will see her back in one year. Past Medical History:   Diagnosis Date    History of echocardiogram 04/25/2015    EF 55-60%. No WMA. Gr 1 DDfx. RVSP 30-35 mmHg. Mild-mod TR. 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  Vertigo        Current Outpatient Medications   Medication Sig Dispense Refill    diazePAM (VALIUM) 5 mg tablet 1 tablet 1 hour prior to procedure. 1 Tab 0    hydroCHLOROthiazide (HYDRODIURIL) 25 mg tablet Take 1 Tab by mouth daily. 90 Tab 3    lisinopril (PRINIVIL, ZESTRIL) 40 mg tablet Take 1 Tab by mouth daily. 90 Tab 3    warfarin (COUMADIN) 5 mg tablet Take 1 Tab by mouth daily. 90 Tab 3    aspirin 81 mg chewable tablet Take 81 mg by mouth daily.  meclizine (ANTIVERT) 25 mg chewable tablet Take 25 mg by mouth three (3) times daily as needed.          Social History   reports that she has never smoked. She has never used smokeless tobacco.   reports that she does not drink alcohol. Family History  family history is not on file. Review of Systems  Except as stated above include:  Constitutional: Negative for fever, chills and malaise/fatigue. HEENT: No congestion or recent URI. Gastrointestinal: No nausea, vomiting, abdominal pain, bloody stools. Pulmonary:  Negative except as stated above. Cardiac:  Negative except as stated above. Musculoskeletal: Negative except as stated above. Neurological:  No localized symptoms. Skin:  Negative except as stated above. Psych:  Negative except as stated above. Endocrine:  Negative except as stated above. PHYSICAL EXAM  BP Readings from Last 3 Encounters:   09/05/19 130/70   11/07/18 142/76   11/05/18 142/84     Pulse Readings from Last 3 Encounters:   09/05/19 70   11/07/18 70   11/05/18 96     Wt Readings from Last 3 Encounters:   09/05/19 57.6 kg (127 lb)   11/07/18 59.9 kg (132 lb)   11/05/18 59.9 kg (132 lb)     General:   Well developed, well groomed. Head/Neck:   No jugular venous distention     No carotid bruits. No evidence of xanthelasma. Lungs:   No respiratory distress. Clear bilaterally. Heart:    Regular rate and rhythm. Normal S1/S2. Palpation of heart with normal point of maximum impulse. No significant murmurs, rubs or gallops. Abdomen:   Soft and nontender. No palpable abdominal mass or bruits. Extremities:   Intact peripheral pulses. No significant edema. Neurological:   Alert and oriented to person, place, time. No focal neurological deficit visually. Skin:   No obvious rash    Blood Pressure Metric:  Monitor recommended and adjustments stated if needed.

## 2019-10-02 ENCOUNTER — ANTI-COAG VISIT (OUTPATIENT)
Dept: CARDIOLOGY CLINIC | Age: 84
End: 2019-10-02

## 2019-10-02 DIAGNOSIS — I99.8 ISCHEMIC FINGER: ICD-10-CM

## 2019-10-02 DIAGNOSIS — Z79.01 LONG TERM CURRENT USE OF ANTICOAGULANT THERAPY: ICD-10-CM

## 2019-10-02 LAB
INR BLD: 3.7
PT POC: 44.8 SECONDS
VALID INTERNAL CONTROL?: YES

## 2019-10-02 NOTE — PROGRESS NOTES
Verbal order from Dr. Leif Armstrong Continue Coumadin 2.5mg daily except 5mg on Tues, [address], and Sat

## 2019-10-30 ENCOUNTER — ANTI-COAG VISIT (OUTPATIENT)
Dept: CARDIOLOGY CLINIC | Age: 84
End: 2019-10-30

## 2019-10-30 DIAGNOSIS — Z79.01 LONG TERM CURRENT USE OF ANTICOAGULANT THERAPY: Primary | ICD-10-CM

## 2019-10-30 DIAGNOSIS — I99.8 ISCHEMIC FINGER: ICD-10-CM

## 2019-10-30 LAB
INR BLD: 3.2
PT POC: 38.6 SECONDS
VALID INTERNAL CONTROL?: YES

## 2019-10-30 NOTE — PROGRESS NOTES
Verbal order and read back per Ramona Barraza Her     Continue Coumadin 2.5mg daily except 5mg on Tues, [address], and Sat

## 2019-11-05 RX ORDER — WARFARIN SODIUM 5 MG/1
TABLET ORAL
Qty: 90 TAB | Refills: 3 | Status: SHIPPED | OUTPATIENT
Start: 2019-11-05 | End: 2020-10-30

## 2019-11-05 RX ORDER — HYDROCHLOROTHIAZIDE 25 MG/1
TABLET ORAL
Qty: 90 TAB | Refills: 3 | Status: SHIPPED | OUTPATIENT
Start: 2019-11-05 | End: 2020-10-30

## 2019-11-05 RX ORDER — LISINOPRIL 40 MG/1
TABLET ORAL
Qty: 90 TAB | Refills: 3 | Status: SHIPPED | OUTPATIENT
Start: 2019-11-05 | End: 2020-09-24

## 2019-12-04 ENCOUNTER — ANTI-COAG VISIT (OUTPATIENT)
Dept: CARDIOLOGY CLINIC | Age: 84
End: 2019-12-04

## 2019-12-04 DIAGNOSIS — I99.8 ISCHEMIC FINGER: ICD-10-CM

## 2019-12-04 DIAGNOSIS — Z79.01 LONG TERM CURRENT USE OF ANTICOAGULANT THERAPY: ICD-10-CM

## 2019-12-04 LAB
INR BLD: 3.3
PT POC: 39.8 SECONDS
VALID INTERNAL CONTROL?: YES

## 2019-12-04 NOTE — PROGRESS NOTES
The INR is above the therapeutic range. Ask the patient about bleeding complications.   Please make the following adjustments to Coumadin dosing: Hold today then Continue Coumadin 5mg daily except 2.5 mg on Tues, Thur, and Sat   Repeat the INR in 5 weeks

## 2020-01-08 ENCOUNTER — ANTI-COAG VISIT (OUTPATIENT)
Dept: CARDIOLOGY CLINIC | Age: 85
End: 2020-01-08

## 2020-01-08 DIAGNOSIS — I99.8 ISCHEMIC FINGER: ICD-10-CM

## 2020-01-08 DIAGNOSIS — Z79.01 LONG TERM CURRENT USE OF ANTICOAGULANT THERAPY: ICD-10-CM

## 2020-01-08 LAB
INR BLD: 2.3
PT POC: 28 SECONDS
VALID INTERNAL CONTROL?: YES

## 2020-01-08 NOTE — PROGRESS NOTES
The INR is stable and therapeutic.    Continue Coumadin 5mg daily except 2.5 mg on Tues, Thur, and Sat   Recheck INR in 4 weeks

## 2020-02-05 ENCOUNTER — ANTI-COAG VISIT (OUTPATIENT)
Dept: CARDIOLOGY CLINIC | Age: 85
End: 2020-02-05

## 2020-02-05 DIAGNOSIS — Z79.01 LONG TERM CURRENT USE OF ANTICOAGULANT THERAPY: ICD-10-CM

## 2020-02-05 DIAGNOSIS — I99.8 ISCHEMIC FINGER: ICD-10-CM

## 2020-02-05 LAB
INR BLD: 2.3
PT POC: 27.8 SECONDS
VALID INTERNAL CONTROL?: YES

## 2020-03-04 ENCOUNTER — ANTI-COAG VISIT (OUTPATIENT)
Dept: CARDIOLOGY CLINIC | Age: 85
End: 2020-03-04

## 2020-03-04 DIAGNOSIS — Z79.01 LONG TERM CURRENT USE OF ANTICOAGULANT THERAPY: ICD-10-CM

## 2020-03-04 DIAGNOSIS — I99.8 ISCHEMIC FINGER: ICD-10-CM

## 2020-03-04 LAB
INR BLD: 2.4
PT POC: 28.7 SECONDS
VALID INTERNAL CONTROL?: YES

## 2020-03-04 NOTE — PROGRESS NOTES
The INR is stable and therapeutic.  Continue Continue Coumadin 5mg daily except 2.5 mg on Tues, Thur, and Sat   Recheck INR in 4 weeks

## 2020-04-22 ENCOUNTER — ANTI-COAG VISIT (OUTPATIENT)
Dept: CARDIOLOGY CLINIC | Age: 85
End: 2020-04-22

## 2020-04-22 DIAGNOSIS — I99.8 ISCHEMIC FINGER: Primary | ICD-10-CM

## 2020-04-22 DIAGNOSIS — Z79.01 LONG TERM CURRENT USE OF ANTICOAGULANT THERAPY: ICD-10-CM

## 2020-04-22 LAB
INR BLD: 1.7
PT POC: 20.5 SECONDS
VALID INTERNAL CONTROL?: YES

## 2020-04-22 NOTE — PROGRESS NOTES
Verbal order and read back per Melia Ing DO  The INR is below the therapeutic range. Please make the following adjustments to Coumadin dosing: Continue Coumadin 5mg daily except 2.5 mg on Tues, Thur, and Sat . Repeat the INR in 2 weeks at home.

## 2020-04-22 NOTE — PATIENT INSTRUCTIONS
Verbal order and read back per Mare Frye DO The INR is below the therapeutic range. Please make the following adjustments to Coumadin dosing: Continue Coumadin 5mg daily except 2.5 mg on Tues, Thur, and Sat . Repeat the INR in 2 weeks at home.

## 2020-05-03 LAB — INR, EXTERNAL: 1.9

## 2020-05-04 ENCOUNTER — TELEPHONE ANTICOAG (OUTPATIENT)
Dept: CARDIOLOGY CLINIC | Age: 85
End: 2020-05-04

## 2020-05-04 DIAGNOSIS — Z79.01 LONG TERM CURRENT USE OF ANTICOAGULANT THERAPY: ICD-10-CM

## 2020-05-04 DIAGNOSIS — I99.8 ISCHEMIC FINGER: ICD-10-CM

## 2020-05-04 NOTE — PROGRESS NOTES
Verbal order and read back per Catarino Flanagan NP  The INR is below the therapeutic range. Please make the following adjustments to Coumadin dosing: Continue Coumadin 5mg daily except 2.5 mg on Tues, Thur, and Sat. Repeat the INR in 1 week.

## 2020-05-04 NOTE — PROGRESS NOTES
Spoke to patient. Verified name and . Patient made aware of instructions. Patient would like to check INR this Friday due to moving this weekend.

## 2020-05-08 ENCOUNTER — TELEPHONE ANTICOAG (OUTPATIENT)
Dept: CARDIOLOGY CLINIC | Age: 85
End: 2020-05-08

## 2020-05-08 DIAGNOSIS — Z79.01 LONG TERM CURRENT USE OF ANTICOAGULANT THERAPY: ICD-10-CM

## 2020-05-08 DIAGNOSIS — I99.8 ISCHEMIC FINGER: ICD-10-CM

## 2020-05-08 LAB — INR, EXTERNAL: 2.5

## 2020-05-08 NOTE — PROGRESS NOTES
Verbal order and read back per Gabriel Butcher MD  The INR is stable and therapeutic. Continue same dose of coumadin (Continue Coumadin 5mg daily except 2.5 mg on Tues, Thur, and Sat )  recheck in 1 week.

## 2020-05-21 ENCOUNTER — TELEPHONE ANTICOAG (OUTPATIENT)
Dept: CARDIOLOGY CLINIC | Age: 85
End: 2020-05-21

## 2020-05-21 DIAGNOSIS — Z79.01 LONG TERM CURRENT USE OF ANTICOAGULANT THERAPY: ICD-10-CM

## 2020-05-21 DIAGNOSIS — I99.8 ISCHEMIC FINGER: ICD-10-CM

## 2020-05-21 LAB — INR, EXTERNAL: 1.5

## 2020-05-21 NOTE — PROGRESS NOTES
The INR is below the therapeutic range. Please make the following adjustments to Coumadin dosing: Take 7.5mg x1 then Continue Coumadin 5mg daily except 2.5 mg on Tues, Thur, and Sat. Repeat the INR in 2 weeks.

## 2020-06-03 LAB — INR, EXTERNAL: 3.4

## 2020-06-04 ENCOUNTER — TELEPHONE ANTICOAG (OUTPATIENT)
Dept: CARDIOLOGY CLINIC | Age: 85
End: 2020-06-04

## 2020-06-04 DIAGNOSIS — I99.8 ISCHEMIC FINGER: ICD-10-CM

## 2020-06-04 DIAGNOSIS — Z79.01 LONG TERM CURRENT USE OF ANTICOAGULANT THERAPY: ICD-10-CM

## 2020-06-04 NOTE — PROGRESS NOTES
Verbal order and read back per Judit Salmeron MD  INR above therapeutic range  Continue Coumadin 5mg daily except 2.5 mg on Tues, Thur, and Sat. Eat greens. Recheck in 1 week. Patient only took Coumadin 2.5mg 6/3/2020. Patient made aware of dosing and recheck instructions, no questions.

## 2020-06-10 LAB — INR, EXTERNAL: 1.6

## 2020-06-11 ENCOUNTER — TELEPHONE ANTICOAG (OUTPATIENT)
Dept: CARDIOLOGY CLINIC | Age: 85
End: 2020-06-11

## 2020-06-11 DIAGNOSIS — I99.8 ISCHEMIC FINGER: ICD-10-CM

## 2020-06-11 DIAGNOSIS — Z79.01 LONG TERM CURRENT USE OF ANTICOAGULANT THERAPY: ICD-10-CM

## 2020-06-11 NOTE — PROGRESS NOTES
Verbal order and read back per Kamini Purcell, NP  7.5 mg x 1 then Continue Coumadin 5mg daily except 2.5 mg on Tues, Thur, and Sat. Recheck in 1 week.

## 2020-06-11 NOTE — PROGRESS NOTES
Verbal order and read back per Dr. Woodard Narrow  INR below therapeutic range 1.6 - 6/10/2020  7.5 mg x 1 then Continue Coumadin 5mg daily except 2.5 mg on Tues, [address], and Sat. Recheck in 1 week. Decrease greens intake. Patient made aware of dosing and recheck instructions, no questions.

## 2020-06-22 LAB — INR, EXTERNAL: 2

## 2020-06-23 ENCOUNTER — TELEPHONE ANTICOAG (OUTPATIENT)
Dept: CARDIOLOGY CLINIC | Age: 85
End: 2020-06-23

## 2020-06-23 NOTE — PROGRESS NOTES
Verbal order and read back per Aaron Hash, DO. The INR is stable and therapeutic. Continue same dose of coumadin and recheck in 2 weeks     Continue Coumadin 5mg daily except 2.5 mg on Tues, Thur, and Sat. Recheck in 2 weeks. Patient has started Valeran root for sleep. No drug interaction noted. Patient made aware of dosing and recheck instructions, no questions.

## 2020-07-08 ENCOUNTER — TELEPHONE ANTICOAG (OUTPATIENT)
Dept: CARDIOLOGY CLINIC | Age: 85
End: 2020-07-08

## 2020-07-08 DIAGNOSIS — I99.8 ISCHEMIC FINGER: ICD-10-CM

## 2020-07-08 DIAGNOSIS — Z79.01 LONG TERM CURRENT USE OF ANTICOAGULANT THERAPY: ICD-10-CM

## 2020-07-08 LAB — INR, EXTERNAL: 2.1

## 2020-07-08 NOTE — PROGRESS NOTES
The INR is stable and therapeutic. Continue same dose of coumadin (Continue Coumadin 5mg daily except 2.5 mg on Tues, Thur, and Sat.   Recheck in 2 weeks. )

## 2020-07-22 ENCOUNTER — TELEPHONE ANTICOAG (OUTPATIENT)
Dept: CARDIOLOGY CLINIC | Age: 85
End: 2020-07-22

## 2020-07-22 DIAGNOSIS — Z79.01 LONG TERM CURRENT USE OF ANTICOAGULANT THERAPY: ICD-10-CM

## 2020-07-22 DIAGNOSIS — I99.8 ISCHEMIC FINGER: ICD-10-CM

## 2020-07-22 LAB — INR, EXTERNAL: 1.6

## 2020-07-27 NOTE — PROGRESS NOTES
The INR is below the therapeutic range. Please make the following adjustments to Coumadin dosing:  Continue Coumadin 5mg daily except 2.5 mg on Tues, Thur, and Sat. Recheck in 2 weeks. Rigoberto Freedman

## 2020-08-05 ENCOUNTER — TELEPHONE ANTICOAG (OUTPATIENT)
Dept: CARDIOLOGY CLINIC | Age: 85
End: 2020-08-05

## 2020-08-05 DIAGNOSIS — Z79.01 LONG TERM CURRENT USE OF ANTICOAGULANT THERAPY: ICD-10-CM

## 2020-08-05 DIAGNOSIS — I99.8 ISCHEMIC FINGER: ICD-10-CM

## 2020-08-05 LAB — INR, EXTERNAL: 2.4

## 2020-08-05 NOTE — PROGRESS NOTES
Verbal order and read back per Rangel Patel NP  The INR is stable and therapeutic. Continue same dose of coumadin and recheck in 2 weeks. Lm on vm for patient to give office a call.

## 2020-08-19 ENCOUNTER — TELEPHONE ANTICOAG (OUTPATIENT)
Dept: CARDIOLOGY CLINIC | Age: 85
End: 2020-08-19

## 2020-08-19 LAB — INR, EXTERNAL: 2.7

## 2020-08-19 NOTE — PROGRESS NOTES
Verbal order and read back per Donnie Pastor, NP   Continue Coumadin 5mg daily except 2.5 mg on Tues, Thur, and Sat. Recheck in 2 weeks.

## 2020-08-19 NOTE — PROGRESS NOTES
Verbal order and read back per Arsalan Berrios NP  INR within therapeutic range   Continue Coumadin 5mg daily except 2.5 mg on Tues, Thur, and Sat. Recheck in 2 weeks. Unable to contact patient. Message left to call our office for dosing instructions.

## 2020-09-03 ENCOUNTER — TELEPHONE ANTICOAG (OUTPATIENT)
Dept: CARDIOLOGY CLINIC | Age: 85
End: 2020-09-03

## 2020-09-03 LAB — INR, EXTERNAL: 1.6

## 2020-09-03 NOTE — PROGRESS NOTES
Verbal order and read back per Bud Mueller NP  INR below therapeutic range  Coumadin 5mg today, then Continue Coumadin 5mg daily except 2.5 mg on Tues, Thur, and Sat. Recheck in 2 weeks. Patient made aware of dosing and recheck instructions, no questions.

## 2020-09-16 ENCOUNTER — TELEPHONE ANTICOAG (OUTPATIENT)
Dept: CARDIOLOGY CLINIC | Age: 85
End: 2020-09-16

## 2020-09-16 LAB — INR, EXTERNAL: 2.5

## 2020-09-16 NOTE — PROGRESS NOTES
Verbal order and read back per Polo Shipley NP  iNR within therapeutic range   Continue Coumadin 5mg daily except 2.5 mg on Tues, Thur, and Sat. Recheck in 2 weeks  Patient made aware of dosing and recheck instructions, no questions.

## 2020-09-24 ENCOUNTER — OFFICE VISIT (OUTPATIENT)
Dept: CARDIOLOGY CLINIC | Age: 85
End: 2020-09-24
Payer: MEDICARE

## 2020-09-24 VITALS
SYSTOLIC BLOOD PRESSURE: 142 MMHG | HEART RATE: 80 BPM | BODY MASS INDEX: 22.32 KG/M2 | OXYGEN SATURATION: 100 % | DIASTOLIC BLOOD PRESSURE: 92 MMHG | WEIGHT: 126 LBS | HEIGHT: 63 IN

## 2020-09-24 DIAGNOSIS — I74.9 EMBOLIC INFARCTION (HCC): ICD-10-CM

## 2020-09-24 DIAGNOSIS — R42 VERTIGO: Primary | ICD-10-CM

## 2020-09-24 DIAGNOSIS — Z79.01 LONG TERM CURRENT USE OF ANTICOAGULANT THERAPY: ICD-10-CM

## 2020-09-24 DIAGNOSIS — E78.5 DYSLIPIDEMIA: ICD-10-CM

## 2020-09-24 DIAGNOSIS — I10 ESSENTIAL HYPERTENSION: ICD-10-CM

## 2020-09-24 PROCEDURE — G8427 DOCREV CUR MEDS BY ELIG CLIN: HCPCS | Performed by: INTERNAL MEDICINE

## 2020-09-24 PROCEDURE — G8536 NO DOC ELDER MAL SCRN: HCPCS | Performed by: INTERNAL MEDICINE

## 2020-09-24 PROCEDURE — 1090F PRES/ABSN URINE INCON ASSESS: CPT | Performed by: INTERNAL MEDICINE

## 2020-09-24 PROCEDURE — G8400 PT W/DXA NO RESULTS DOC: HCPCS | Performed by: INTERNAL MEDICINE

## 2020-09-24 PROCEDURE — 99214 OFFICE O/P EST MOD 30 MIN: CPT | Performed by: INTERNAL MEDICINE

## 2020-09-24 PROCEDURE — 1101F PT FALLS ASSESS-DOCD LE1/YR: CPT | Performed by: INTERNAL MEDICINE

## 2020-09-24 PROCEDURE — 93000 ELECTROCARDIOGRAM COMPLETE: CPT | Performed by: INTERNAL MEDICINE

## 2020-09-24 PROCEDURE — G8420 CALC BMI NORM PARAMETERS: HCPCS | Performed by: INTERNAL MEDICINE

## 2020-09-24 PROCEDURE — G8510 SCR DEP NEG, NO PLAN REQD: HCPCS | Performed by: INTERNAL MEDICINE

## 2020-09-24 NOTE — PROGRESS NOTES
Giancarlo Beavers presents today for   Chief Complaint   Patient presents with    Follow-up     1 year follow up       Giancarlo Beavers preferred language for health care discussion is english/other. Is someone accompanying this pt? no    Is the patient using any DME equipment during [address]? no    Depression Screening:  3 most recent PHQ Screens 9/24/2020   Little interest or pleasure in doing things Not at all   Feeling down, depressed, irritable, or hopeless Not at all   Total Score PHQ 2 0       Learning Assessment:  Learning Assessment 9/24/2020   PRIMARY LEARNER Patient   HIGHEST LEVEL OF EDUCATION - PRIMARY LEARNER  -   BARRIERS PRIMARY LEARNER -   [facility]    NEED -   LEARNER PREFERENCE PRIMARY DEMONSTRATION   ANSWERED BY patient   RELATIONSHIP SELF       Abuse Screening:  Abuse Screening Questionnaire 9/24/2020   Do you ever feel afraid of your partner? N   Are you in a relationship with someone who physically or mentally threatens you? N   Is it safe for you to go home? Y       Fall Risk  Fall Risk Assessment, last 12 mths 9/24/2020   Able to walk? Yes   Fall in past 12 months? No       Pt currently taking Anticoagulant therapy? Warfarin 5 mg daily except 2.5 mg Tuesday, Thursday, and Saturday     Coordination of Care:  1. Have you been to the ER, urgent care clinic since your last visit? Hospitalized since your last visit? no    2. Have you seen or consulted any other health care providers outside of the [address] [place] [address] since your last visit? Include any pap smears or colon screening.  no

## 2020-09-24 NOTE — PROGRESS NOTES
History of Present Illness:  60-year-old female here for follow up. She moved out of town and drives about three hours to get here, but she usually spends time with her daughter and friends when she is here. She denies any new chest pain, dyspnea, PND, orthopnea or edema. She does get a little winded when she overdoes it. She stops to rest and always feels better. 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 She has not had any known embolic events. She switched to using Coumadin at home and checking the INR twice a month. She is a little anxious about this, wonders about other options. Impression:  1. History of recurrent vertigo and balance issues, seen by [facility]. 2. History of remote atrial fibrillation, diagnosed about 25 years ago at [facility]. She had a loop recorder placed, explanted November, 2018 without any sustained arrhythmias. 3. 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 However, endothelial event could not be excluded. 4. Hypertension and white coat syndrome. 5. Dyslipidemia. Plan:  She has chronic discoloration in her toes, unchanged. She is on Coumadin and aspirin. We talked about options for Coumadin replacement, including Xarelto 20 mg daily or Eliquis 5 mg twice daily. She is going to potentially talk with her primary physician near her, about five minutes away, for potential options. All questions answered and I will tentatively see back in a year. Past Medical History:   Diagnosis Date    History of echocardiogram 04/25/2015    EF 55-60%. No WMA. Gr 1 DDfx. RVSP 30-35 mmHg. Mild-mod TR. 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       Vertigo        Current Outpatient Medications   Medication Sig Dispense Refill    warfarin (COUMADIN) 5 mg tablet TAKE 1 TABLET BY MOUTH DAILY 90 Tab 3    hydroCHLOROthiazide (HYDRODIURIL) 25 mg tablet TAKE 1 TABLET BY MOUTH DAILY 90 Tab 3    diazePAM (VALIUM) 5 mg tablet 1 tablet 1 hour prior to procedure. 1 Tab 0    lisinopril (PRINIVIL, ZESTRIL) 40 mg tablet Take 1 Tab by mouth daily. 90 Tab 3    aspirin 81 mg chewable tablet Take 81 mg by mouth daily.  meclizine (ANTIVERT) 25 mg chewable tablet Take 25 mg by mouth three (3) times daily as needed. Social History   reports that she has never smoked. She has never used smokeless tobacco.   reports no history of alcohol use. Family History  family history is not on file. Review of Systems  Except as stated above include:  Constitutional: Negative for fever, chills and malaise/fatigue. HEENT: No congestion or recent URI. Gastrointestinal: No nausea, vomiting, abdominal pain, bloody stools. Pulmonary:  Negative except as stated above. Cardiac:  Negative except as stated above. Musculoskeletal: Negative except as stated above. Neurological:  No localized symptoms. Skin:  Negative except as stated above. Psych:  Negative except as stated above. Endocrine:  Negative except as stated above. PHYSICAL EXAM  BP Readings from Last 3 Encounters:   09/24/20 (!) 142/92   09/05/19 130/70   11/07/18 142/76     Pulse Readings from Last 3 Encounters:   09/24/20 80   09/05/19 70   11/07/18 70     Wt Readings from Last 3 Encounters:   09/24/20 57.2 kg (126 lb)   09/05/19 57.6 kg (127 lb)   11/07/18 59.9 kg (132 lb)     General:   Well developed, well groomed. Head/Neck:   No jugular venous distention     No carotid bruits. No evidence of xanthelasma. Lungs:   No respiratory distress. Clear bilaterally. Heart:    Regular rate and rhythm. Normal S1/S2. Palpation of heart with normal point of maximum impulse. No significant murmurs, rubs or gallops. Abdomen:   Soft and nontender.       No palpable abdominal mass or bruits. Extremities:   Intact peripheral pulses. No significant edema. Neurological:   Alert and oriented to person, place, time. No focal neurological deficit visually. Skin:   No obvious rash    Blood Pressure Metric:  Monitor recommended and adjustments stated if needed.

## 2020-09-30 LAB — INR, EXTERNAL: 1.7

## 2020-10-01 ENCOUNTER — TELEPHONE ANTICOAG (OUTPATIENT)
Dept: CARDIOLOGY CLINIC | Age: 85
End: 2020-10-01

## 2020-10-01 NOTE — PROGRESS NOTES
Verbal order and read back per Carmen Mckeon NP  INR below therapeutic range  Change to Coumadin 5mg daily except 2.5 mg on Tues and Sat. Recheck in 2 weeks. Patient made aware of dosing and recheck instructions, no questions.

## 2020-10-14 LAB — INR, EXTERNAL: 3.5

## 2020-10-15 ENCOUNTER — TELEPHONE ANTICOAG (OUTPATIENT)
Dept: CARDIOLOGY CLINIC | Age: 85
End: 2020-10-15

## 2020-10-15 NOTE — PROGRESS NOTES
Verbal order and read back per Laney Fu NP  iNR above therapeutic range  Hold Coumadin x1 day(patient held dose 10/14/2020); then continue Coumadin 5mg daily except 2.5 mg on Tues and Sat. Recheck in 2 weeks. Patient made aware of dosing and recheck instructions, no questions.

## 2020-10-28 LAB — INR, EXTERNAL: 3.7

## 2020-10-29 ENCOUNTER — TELEPHONE ANTICOAG (OUTPATIENT)
Dept: CARDIOLOGY CLINIC | Age: 85
End: 2020-10-29

## 2020-10-29 NOTE — PROGRESS NOTES
Verbal order and read back per Jennifer Sarabia MD  INR above therapeutic range  Hold Coumadin x1 day(patient held 10/28/2020 dose); then continue Coumadin 5mg daily except 2.5 mg on Tues and Sat. Recheck in 2 weeks. Patient made aware of dosing and recheck instructions, no questions.

## 2020-10-30 RX ORDER — HYDROCHLOROTHIAZIDE 25 MG/1
TABLET ORAL
Qty: 90 TAB | Refills: 3 | Status: SHIPPED | OUTPATIENT
Start: 2020-10-30 | End: 2021-10-25

## 2020-10-30 RX ORDER — LISINOPRIL 40 MG/1
TABLET ORAL
Qty: 90 TAB | Refills: 3 | Status: SHIPPED | OUTPATIENT
Start: 2020-10-30 | End: 2021-10-25

## 2020-10-30 RX ORDER — WARFARIN SODIUM 5 MG/1
TABLET ORAL
Qty: 90 TAB | Refills: 3 | Status: SHIPPED | OUTPATIENT
Start: 2020-10-30 | End: 2021-10-25

## 2020-11-11 ENCOUNTER — TELEPHONE ANTICOAG (OUTPATIENT)
Dept: CARDIOLOGY CLINIC | Age: 85
End: 2020-11-11

## 2020-11-11 LAB — INR, EXTERNAL: 2.6

## 2020-11-11 NOTE — PROGRESS NOTES
Verbal order and read back per Nickie Yun, DO  INR within therapeutic range   continue Coumadin 5mg daily except 2.5 mg on Tues and Sat. Recheck in 2 weeks. Patient made aware of dosing and recheck instructions, no questions.

## 2020-12-11 ENCOUNTER — TELEPHONE ANTICOAG (OUTPATIENT)
Dept: CARDIOLOGY CLINIC | Age: 85
End: 2020-12-11

## 2020-12-11 DIAGNOSIS — I99.8 ISCHEMIC FINGER: ICD-10-CM

## 2020-12-11 DIAGNOSIS — Z79.01 LONG TERM CURRENT USE OF ANTICOAGULANT THERAPY: ICD-10-CM

## 2020-12-11 LAB — INR, EXTERNAL: 3

## 2020-12-11 NOTE — PROGRESS NOTES
The INR is stable and therapeutic. Continue same dose of coumadin ( continue Coumadin 5mg daily except 2.5 mg on Tues and Sat.   Recheck in 2 weeks.)

## 2020-12-18 LAB — INR, EXTERNAL: 3.2

## 2020-12-21 ENCOUNTER — TELEPHONE ANTICOAG (OUTPATIENT)
Dept: CARDIOLOGY CLINIC | Age: 85
End: 2020-12-21

## 2020-12-21 DIAGNOSIS — I99.8 ISCHEMIC FINGER: ICD-10-CM

## 2020-12-21 DIAGNOSIS — Z79.01 LONG TERM CURRENT USE OF ANTICOAGULANT THERAPY: ICD-10-CM

## 2020-12-21 NOTE — PROGRESS NOTES
Verbal order and read back per Truman Peng NP  Continue Coumadin 5mg daily except 2.5 mg on Tues and Sat. Recheck in 2 weeks.

## 2021-01-04 LAB
INR, EXTERNAL: 0.28
INR, EXTERNAL: 2.8

## 2021-01-05 ENCOUNTER — TELEPHONE ANTICOAG (OUTPATIENT)
Dept: CARDIOLOGY CLINIC | Age: 86
End: 2021-01-05

## 2021-01-05 DIAGNOSIS — I99.8 ISCHEMIC FINGER: ICD-10-CM

## 2021-01-05 DIAGNOSIS — Z79.01 LONG TERM CURRENT USE OF ANTICOAGULANT THERAPY: ICD-10-CM

## 2021-01-05 NOTE — PROGRESS NOTES
The INR is stable and therapeutic. Verbal order and read back per Sabino Chopra, NP  Continue Coumadin 5mg daily except 2.5 mg on Tues and Sat. Recheck in 2 weeks.

## 2021-01-20 ENCOUNTER — TELEPHONE ANTICOAG (OUTPATIENT)
Dept: CARDIOLOGY CLINIC | Age: 86
End: 2021-01-20

## 2021-01-20 DIAGNOSIS — Z79.01 LONG TERM CURRENT USE OF ANTICOAGULANT THERAPY: ICD-10-CM

## 2021-01-20 DIAGNOSIS — I99.8 ISCHEMIC FINGER: ICD-10-CM

## 2021-01-20 LAB — INR, EXTERNAL: 2.3

## 2021-01-20 NOTE — PROGRESS NOTES
Verbal order and read back per Emilia Pathak NP  The INR is stable and therapeutic. Continue Coumadin 5mg daily except 2.5 mg on Tues and Sat. Recheck in 2 weeks.

## 2021-02-03 ENCOUNTER — TELEPHONE ANTICOAG (OUTPATIENT)
Dept: CARDIOLOGY CLINIC | Age: 86
End: 2021-02-03

## 2021-02-03 LAB — INR, EXTERNAL: 2.5

## 2021-02-03 NOTE — PROGRESS NOTES
Verbal order and read back per Nani Falk NP  INR within therapeutic range  Continue Coumadin 5mg daily except 2.5 mg on Tues and Sat. Recheck in 2 weeks. Patient made aware of dosing and recheck instructions, no questions.

## 2021-02-17 ENCOUNTER — TELEPHONE ANTICOAG (OUTPATIENT)
Dept: CARDIOLOGY CLINIC | Age: 86
End: 2021-02-17

## 2021-02-17 LAB — INR, EXTERNAL: 4

## 2021-02-17 NOTE — PROGRESS NOTES
Verbal order and read back per Ulyses Sever, NP  iNR above therapeutic range  Hold Coumadin today, Coumadin 2.5mg tomorrow, then Continue Coumadin 5mg daily except 2.5 mg on Tues and Sat. Recheck in 2 weeks. Patient made aware of dosing and recheck instructions, no questions.

## 2021-03-03 ENCOUNTER — TELEPHONE ANTICOAG (OUTPATIENT)
Dept: CARDIOLOGY CLINIC | Age: 86
End: 2021-03-03

## 2021-03-03 DIAGNOSIS — I99.8 ISCHEMIC FINGER: ICD-10-CM

## 2021-03-03 DIAGNOSIS — Z79.01 LONG TERM CURRENT USE OF ANTICOAGULANT THERAPY: ICD-10-CM

## 2021-03-03 LAB — INR, EXTERNAL: 2

## 2021-03-03 NOTE — PROGRESS NOTES
Verbal order and read back per Lizabeth Templeton NP  iNR within therapeutic range  Continue Coumadin 5mg daily except 2.5 mg on Tues and Sat. Recheck in 2 weeks. Patient made aware of dosing and recheck instructions, no questions.

## 2021-03-31 ENCOUNTER — TELEPHONE ANTICOAG (OUTPATIENT)
Dept: CARDIOLOGY CLINIC | Age: 86
End: 2021-03-31

## 2021-03-31 DIAGNOSIS — Z79.01 LONG TERM CURRENT USE OF ANTICOAGULANT THERAPY: ICD-10-CM

## 2021-03-31 DIAGNOSIS — I99.8 ISCHEMIC FINGER: ICD-10-CM

## 2021-03-31 LAB — INR, EXTERNAL: 2.6

## 2021-04-14 LAB — INR, EXTERNAL: 3.4

## 2021-04-15 ENCOUNTER — TELEPHONE ANTICOAG (OUTPATIENT)
Dept: CARDIOLOGY CLINIC | Age: 86
End: 2021-04-15

## 2021-04-15 DIAGNOSIS — I99.8 ISCHEMIC FINGER: ICD-10-CM

## 2021-04-15 DIAGNOSIS — Z79.01 LONG TERM CURRENT USE OF ANTICOAGULANT THERAPY: ICD-10-CM

## 2021-04-15 NOTE — PROGRESS NOTES
Verbal order and read back per GERMAIN Mathias NP  iNR above therapeutic range  Hold Coumadin x1, then Continue Coumadin 5mg daily except 2.5 mg on Tues and Sat. Recheck in 2 weeks. Patient made aware of dosing and recheck instructions, no questions.

## 2021-04-29 ENCOUNTER — TELEPHONE ANTICOAG (OUTPATIENT)
Dept: CARDIOLOGY CLINIC | Age: 86
End: 2021-04-29

## 2021-04-29 DIAGNOSIS — I99.8 ISCHEMIC FINGER: ICD-10-CM

## 2021-04-29 DIAGNOSIS — Z79.01 LONG TERM CURRENT USE OF ANTICOAGULANT THERAPY: ICD-10-CM

## 2021-04-29 LAB — INR, EXTERNAL: 2.1

## 2021-04-29 NOTE — PROGRESS NOTES
Verbal order and read back per Renetta Richardson MD  INR within therapeutic range  Continue Coumadin 5mg daily except 2.5 mg on Tues and Sat. Recheck in 2 weeks. Patient made aware of dosing and recheck instructions, no questions. Patient needs an in office appointment due to not being at home to use the call in phone to report INR results.

## 2021-05-14 ENCOUNTER — ANTI-COAG VISIT (OUTPATIENT)
Dept: CARDIOLOGY CLINIC | Age: 86
End: 2021-05-14
Payer: MEDICARE

## 2021-05-14 DIAGNOSIS — I99.8 ISCHEMIC FINGER: ICD-10-CM

## 2021-05-14 DIAGNOSIS — Z79.01 LONG TERM CURRENT USE OF ANTICOAGULANT THERAPY: Primary | ICD-10-CM

## 2021-05-14 LAB
INR BLD: 2.7
PT POC: 32.6 SECONDS
VALID INTERNAL CONTROL?: YES

## 2021-05-14 PROCEDURE — 85610 PROTHROMBIN TIME: CPT | Performed by: NURSE PRACTITIONER

## 2021-05-14 NOTE — PATIENT INSTRUCTIONS
The INR is stable and therapeutic. Continue same dose of coumadin (Continue Coumadin 5mg daily except 2.5 mg on Tues and Sat. Recheck in 2 weeks.) Patient normally checks INR at home. daughter had recent surgery.

## 2021-06-02 ENCOUNTER — TELEPHONE ANTICOAG (OUTPATIENT)
Dept: CARDIOLOGY CLINIC | Age: 86
End: 2021-06-02

## 2021-06-02 DIAGNOSIS — I99.8 ISCHEMIC FINGER: ICD-10-CM

## 2021-06-02 DIAGNOSIS — Z79.01 LONG TERM CURRENT USE OF ANTICOAGULANT THERAPY: Primary | ICD-10-CM

## 2021-06-02 LAB — INR, EXTERNAL: 2.8

## 2021-06-02 NOTE — PROGRESS NOTES
Verbal order and read back per Marcia Fortune MD  INR within therapeutic range  Continue Coumadin 5mg daily except 2.5 mg on Tues and Sat. Recheck in 2 weeks. Patient made aware of dosing and recheck instructions, no questions.

## 2021-06-23 ENCOUNTER — TELEPHONE ANTICOAG (OUTPATIENT)
Dept: CARDIOLOGY CLINIC | Age: 86
End: 2021-06-23

## 2021-06-23 DIAGNOSIS — I99.8 ISCHEMIC FINGER: ICD-10-CM

## 2021-06-23 DIAGNOSIS — Z79.01 LONG TERM CURRENT USE OF ANTICOAGULANT THERAPY: Primary | ICD-10-CM

## 2021-06-23 LAB — INR, EXTERNAL: 2.4

## 2021-06-23 NOTE — PROGRESS NOTES
Verbal order and read back per Marielle Cavazos NP  INR within therapeutic range  Continue Coumadin 5mg daily except 2.5 mg on Tues and Sat. Recheck in 2 weeks. Patient made aware of dosing and recheck instructions, no questions.

## 2021-07-07 ENCOUNTER — TELEPHONE ANTICOAG (OUTPATIENT)
Dept: CARDIOLOGY CLINIC | Age: 86
End: 2021-07-07

## 2021-07-07 DIAGNOSIS — I99.8 ISCHEMIC FINGER: ICD-10-CM

## 2021-07-07 DIAGNOSIS — Z79.01 LONG TERM CURRENT USE OF ANTICOAGULANT THERAPY: Primary | ICD-10-CM

## 2021-07-07 LAB — INR, EXTERNAL: 2.2

## 2021-07-07 NOTE — PROGRESS NOTES
Verbal order and read back per Paty Arthur, DO  INR within therapeutic range  Continue Coumadin 5mg daily except 2.5 mg on Tues and Sat. Recheck in 2 weeks. Patient made aware of dosing and recheck instructions, no questions.

## 2021-07-21 ENCOUNTER — TELEPHONE ANTICOAG (OUTPATIENT)
Dept: CARDIOLOGY CLINIC | Age: 86
End: 2021-07-21

## 2021-07-21 DIAGNOSIS — I99.8 ISCHEMIC FINGER: ICD-10-CM

## 2021-07-21 DIAGNOSIS — Z79.01 LONG TERM CURRENT USE OF ANTICOAGULANT THERAPY: Primary | ICD-10-CM

## 2021-07-21 LAB — INR, EXTERNAL: 2.3

## 2021-07-21 NOTE — PROGRESS NOTES
Verbal order and read back per Media Nims, NP  INR within therapeutic range  Continue Coumadin 5mg daily except 2.5 mg on Tues and Sat. Recheck in 2 weeks. Patient made aware of dosing and recheck instructions, no questions.

## 2021-08-11 LAB — INR, EXTERNAL: 2

## 2021-08-12 ENCOUNTER — TELEPHONE ANTICOAG (OUTPATIENT)
Dept: CARDIOLOGY CLINIC | Age: 86
End: 2021-08-12

## 2021-08-12 DIAGNOSIS — I99.8 ISCHEMIC FINGER: ICD-10-CM

## 2021-08-12 DIAGNOSIS — Z79.01 LONG TERM CURRENT USE OF ANTICOAGULANT THERAPY: Primary | ICD-10-CM

## 2021-08-12 NOTE — PROGRESS NOTES
Verbal order and read back per Francis Cline NP  INR within therapeutic range  Continue Coumadin 5mg daily except 2.5 mg on Tues and Sat. Recheck in 2 weeks. Patient made aware of dosing and recheck instructions, no questions.

## 2021-08-25 ENCOUNTER — TELEPHONE ANTICOAG (OUTPATIENT)
Dept: CARDIOLOGY CLINIC | Age: 86
End: 2021-08-25

## 2021-08-25 DIAGNOSIS — Z79.01 LONG TERM CURRENT USE OF ANTICOAGULANT THERAPY: Primary | ICD-10-CM

## 2021-08-25 DIAGNOSIS — I99.8 ISCHEMIC FINGER: ICD-10-CM

## 2021-08-25 LAB — INR, EXTERNAL: 3.1

## 2021-08-25 NOTE — PROGRESS NOTES
Verbal order and read back per Lalo Canela NP  iNR above therapeutic range  Coumadin 2.5mg today, then Continue Coumadin 5mg daily except 2.5 mg on Tues and Sat. Recheck in 2 weeks. Patient made aware of dosing and recheck instructions, no questions.

## 2021-09-08 ENCOUNTER — TELEPHONE ANTICOAG (OUTPATIENT)
Dept: CARDIOLOGY CLINIC | Age: 86
End: 2021-09-08

## 2021-09-08 DIAGNOSIS — I99.8 ISCHEMIC FINGER: ICD-10-CM

## 2021-09-08 DIAGNOSIS — Z79.01 LONG TERM CURRENT USE OF ANTICOAGULANT THERAPY: Primary | ICD-10-CM

## 2021-09-08 LAB — INR, EXTERNAL: 2.6

## 2021-09-08 NOTE — PROGRESS NOTES
Verbal order and read back per Justina Garcia NP  INR within therapeutic range  Continue Coumadin 5mg daily except 2.5 mg on Tues and Sat. Recheck in 2 weeks. Patient made aware of dosing and recheck instructions, no questions.

## 2021-09-15 ENCOUNTER — OFFICE VISIT (OUTPATIENT)
Dept: CARDIOLOGY CLINIC | Age: 86
End: 2021-09-15
Payer: MEDICARE

## 2021-09-15 VITALS
WEIGHT: 126 LBS | SYSTOLIC BLOOD PRESSURE: 150 MMHG | OXYGEN SATURATION: 97 % | HEIGHT: 63 IN | DIASTOLIC BLOOD PRESSURE: 80 MMHG | BODY MASS INDEX: 22.32 KG/M2 | HEART RATE: 75 BPM

## 2021-09-15 DIAGNOSIS — I10 ESSENTIAL HYPERTENSION: ICD-10-CM

## 2021-09-15 DIAGNOSIS — I48.0 PAROXYSMAL ATRIAL FIBRILLATION (HCC): ICD-10-CM

## 2021-09-15 DIAGNOSIS — E78.5 DYSLIPIDEMIA: ICD-10-CM

## 2021-09-15 DIAGNOSIS — Z79.01 LONG TERM CURRENT USE OF ANTICOAGULANT THERAPY: Primary | ICD-10-CM

## 2021-09-15 DIAGNOSIS — I99.8 ISCHEMIC FINGER: ICD-10-CM

## 2021-09-15 PROCEDURE — 93000 ELECTROCARDIOGRAM COMPLETE: CPT | Performed by: INTERNAL MEDICINE

## 2021-09-15 PROCEDURE — G8536 NO DOC ELDER MAL SCRN: HCPCS | Performed by: INTERNAL MEDICINE

## 2021-09-15 PROCEDURE — 99214 OFFICE O/P EST MOD 30 MIN: CPT | Performed by: INTERNAL MEDICINE

## 2021-09-15 PROCEDURE — G8400 PT W/DXA NO RESULTS DOC: HCPCS | Performed by: INTERNAL MEDICINE

## 2021-09-15 PROCEDURE — G8510 SCR DEP NEG, NO PLAN REQD: HCPCS | Performed by: INTERNAL MEDICINE

## 2021-09-15 PROCEDURE — G8420 CALC BMI NORM PARAMETERS: HCPCS | Performed by: INTERNAL MEDICINE

## 2021-09-15 PROCEDURE — 1101F PT FALLS ASSESS-DOCD LE1/YR: CPT | Performed by: INTERNAL MEDICINE

## 2021-09-15 PROCEDURE — G8427 DOCREV CUR MEDS BY ELIG CLIN: HCPCS | Performed by: INTERNAL MEDICINE

## 2021-09-15 PROCEDURE — 1090F PRES/ABSN URINE INCON ASSESS: CPT | Performed by: INTERNAL MEDICINE

## 2021-09-15 NOTE — PROGRESS NOTES
History of Present Illness:  80year old female here for follow up. Overall she is doing quite well. No new chest pain, dyspnea, PND, orthopnea or edema. Her blood pressure is usually elevated in the office, well controlled at home. No significant bleeding issues. She is scheduled to see her primary physician and get her flu shot in the next couple months. She got the COVID vaccine earlier this year. Impression:  1. Hx of remote atrial fibrillation diagnosed about 26 years ago at [facility]. Previous loop recorder explanted November, 2018 without any sustained arrhythmias, but given risk for recurrence, she remains on Coumadin. 2. 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 3. HTN, white coat syndrome. 4. Dyslipidemia. 5. Recurrent vertigo and balance issues, seen by ENT. Last year was a difficult year, but this year seems to be much better. Plan:  Blood pressure is slightly elevated today, but usually well controlled at home. She remains on Coumadin and would prefer to avoid Xarelto or Eliquis. Her vertigo seems to be doing better this year. She does have some occasional insomnia. She will follow up with her primary physician. I will see back in one year. Past Medical History:   Diagnosis Date    History of echocardiogram 04/25/2015    EF 55-60%. No WMA. Gr 1 DDfx. RVSP 30-35 mmHg. Mild-mod TR. 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  Vertigo        Current Outpatient Medications   Medication Sig Dispense Refill    hydroCHLOROthiazide (HYDRODIURIL) 25 mg tablet TAKE 1 TABLET BY MOUTH DAILY 90 Tab 3    warfarin (COUMADIN) 5 mg tablet TAKE 1 TABLET BY MOUTH DAILY 90 Tab 3    lisinopriL (PRINIVIL, ZESTRIL) 40 mg tablet TAKE 1 TABLET BY MOUTH DAILY.  90 Tab 3    diazePAM (VALIUM) 5 mg tablet 1 tablet 1 hour prior to procedure. 1 Tab 0    aspirin 81 mg chewable tablet Take 81 mg by mouth daily.  meclizine (ANTIVERT) 25 mg chewable tablet Take 25 mg by mouth three (3) times daily as needed. Social History   reports that she has never smoked. She has never used smokeless tobacco.   reports no history of alcohol use. Family History  family history is not on file. Review of Systems  Except as stated above include:  Constitutional: Negative for fever, chills and malaise/fatigue. HEENT: No congestion or recent URI. Gastrointestinal: No nausea, vomiting, abdominal pain, bloody stools. Pulmonary:  Negative except as stated above. Cardiac:  Negative except as stated above. Musculoskeletal: Negative except as stated above. Neurological:  No localized symptoms. Skin:  Negative except as stated above. Psych:  Negative except as stated above. Endocrine:  Negative except as stated above. PHYSICAL EXAM  BP Readings from Last 3 Encounters:   09/15/21 (!) 150/80   09/24/20 (!) 142/92   09/05/19 130/70     Pulse Readings from Last 3 Encounters:   09/15/21 75   09/24/20 80   09/05/19 70     Wt Readings from Last 3 Encounters:   09/15/21 57.2 kg (126 lb)   09/24/20 57.2 kg (126 lb)   09/05/19 57.6 kg (127 lb)     General:   Well developed, well groomed. Head/Neck:   No obvious jugular venous distention     No obvious carotid pulsations. No evidence of xanthelasma. Lungs:   No respiratory distress. Clear bilaterally. Heart:  Regular rate and rhythm. Normal S1/S2. Palpation grossly normal.    No significant murmurs, rubs or gallops. Abdomen:   Non-acute abdomen. No obvious pulsations. Extremities:   Intact peripheral pulses. No significant edema. Neurological:   Alert and oriented to person, place, time. No focal neurological deficit visually.   Skin:   No obvious rash    Blood Pressure Metric:  Monitor recommended and adjustments stated if needed.

## 2021-09-15 NOTE — PROGRESS NOTES
Shaina Almeida presents today for   Chief Complaint   Patient presents with    Follow-up     1 year follow up        Shaina Almeida preferred language for health care discussion is english/other. Is someone accompanying this pt? no    Is the patient using any DME equipment during [address]? no    Depression Screening:  3 most recent PHQ Screens 9/15/2021   Little interest or pleasure in doing things Not at all   Feeling down, depressed, irritable, or hopeless Not at all   Total Score PHQ 2 0       Learning Assessment:  Learning Assessment 9/24/2020   PRIMARY LEARNER Patient   HIGHEST LEVEL OF EDUCATION - PRIMARY LEARNER  -   BARRIERS PRIMARY LEARNER -   [facility]    NEED -   LEARNER PREFERENCE PRIMARY DEMONSTRATION   ANSWERED BY patient   RELATIONSHIP SELF       Abuse Screening:  Abuse Screening Questionnaire 9/24/2020   Do you ever feel afraid of your partner? N   Are you in a relationship with someone who physically or mentally threatens you? N   Is it safe for you to go home? Y       Fall Risk  Fall Risk Assessment, last 12 mths 9/15/2021   Able to walk? Yes   Fall in past 12 months? 0   Do you feel unsteady? 0   Are you worried about falling 0       Pt currently taking Anticoagulant therapy? Warfarin     Coordination of Care:  1. Have you been to the ER, urgent care clinic since your last visit? Hospitalized since your last visit? no    2. Have you seen or consulted any other health care providers outside of the [address] [place] [address] since your last visit? Include any pap smears or colon screening.  no

## 2021-09-22 LAB — INR, EXTERNAL: 2.8

## 2021-09-23 ENCOUNTER — TELEPHONE ANTICOAG (OUTPATIENT)
Dept: CARDIOLOGY CLINIC | Age: 86
End: 2021-09-23

## 2021-09-23 DIAGNOSIS — I99.8 ISCHEMIC FINGER: ICD-10-CM

## 2021-09-23 DIAGNOSIS — Z79.01 LONG TERM CURRENT USE OF ANTICOAGULANT THERAPY: Primary | ICD-10-CM

## 2021-09-23 NOTE — PROGRESS NOTES
Verbal order and read back per Jennifer Zee NP  INR within therapeutic range  Continue Coumadin 5mg daily except 2.5 mg on Tues and Sat. Recheck in 2 weeks. Patient made aware of dosing and recheck instructions, no questions.

## 2021-10-06 LAB — INR, EXTERNAL: 3.5

## 2021-10-07 ENCOUNTER — TELEPHONE ANTICOAG (OUTPATIENT)
Dept: CARDIOLOGY CLINIC | Age: 86
End: 2021-10-07

## 2021-10-07 DIAGNOSIS — I99.8 ISCHEMIC FINGER: ICD-10-CM

## 2021-10-07 DIAGNOSIS — Z79.01 LONG TERM CURRENT USE OF ANTICOAGULANT THERAPY: Primary | ICD-10-CM

## 2021-10-07 NOTE — PROGRESS NOTES
Verbal order and read back per Sudarshan Szymanski NP  INR above therapeutic range  Hold Coumadin x1 day; then Continue Coumadin 5mg daily except 2.5 mg on Tues and Sat. Recheck in 2 weeks. Patient made aware of dosing and recheck instructions, no questions.

## 2021-10-25 RX ORDER — HYDROCHLOROTHIAZIDE 25 MG/1
TABLET ORAL
Qty: 90 TABLET | Refills: 3 | Status: SHIPPED | OUTPATIENT
Start: 2021-10-25

## 2021-10-25 RX ORDER — WARFARIN SODIUM 5 MG/1
TABLET ORAL
Qty: 90 TABLET | Refills: 3 | Status: SHIPPED | OUTPATIENT
Start: 2021-10-25 | End: 2021-11-12 | Stop reason: ALTCHOICE

## 2021-10-25 RX ORDER — LISINOPRIL 40 MG/1
TABLET ORAL
Qty: 90 TABLET | Refills: 3 | Status: SHIPPED | OUTPATIENT
Start: 2021-10-25

## 2021-10-27 ENCOUNTER — TELEPHONE ANTICOAG (OUTPATIENT)
Dept: CARDIOLOGY CLINIC | Age: 86
End: 2021-10-27

## 2021-10-27 DIAGNOSIS — I99.8 ISCHEMIC FINGER: ICD-10-CM

## 2021-10-27 DIAGNOSIS — Z79.01 LONG TERM CURRENT USE OF ANTICOAGULANT THERAPY: Primary | ICD-10-CM

## 2021-10-27 LAB — INR, EXTERNAL: 1.9

## 2021-11-10 ENCOUNTER — TELEPHONE ANTICOAG (OUTPATIENT)
Dept: CARDIOLOGY CLINIC | Age: 86
End: 2021-11-10

## 2021-11-10 DIAGNOSIS — Z79.01 LONG TERM CURRENT USE OF ANTICOAGULANT THERAPY: Primary | ICD-10-CM

## 2021-11-10 DIAGNOSIS — I99.8 ISCHEMIC FINGER: ICD-10-CM

## 2021-11-10 LAB — INR, EXTERNAL: 2.6

## 2021-11-10 NOTE — PROGRESS NOTES
Verbal order and read back per Sabino Chopra NP  INR within therapeutic range  Continue Coumadin 5mg daily except 2.5 mg on Tues and Sat. Recheck in 2 weeks.

## 2021-11-12 ENCOUNTER — TELEPHONE (OUTPATIENT)
Dept: CARDIOLOGY CLINIC | Age: 86
End: 2021-11-12

## 2021-11-12 NOTE — TELEPHONE ENCOUNTER
[facility] called, said they were going to send a note over, because pt is now living in [place], under their care, and she has decided that she does not want to be taking coumadin anymore because of the diet restrictions and that she has been switched over to Eliquis. They can be reached at [phone] with questions.

## 2021-11-12 NOTE — TELEPHONE ENCOUNTER
Patient states she will be switching from coumadin to eliquis do to diet restriction    This was instructed to her by [facility] in [place] where is is living

## 2022-01-29 NOTE — TELEPHONE ENCOUNTER
Patient made aware and verbalized understanding. Ciera Boyd LPN   Letter faxed to Isabela MacdonaldJUSTIN  (p) [phone] (q) [phone]            Message  Received: 3 days ago       MD Ciera Story LPN       Caller: Unspecified (3 days ago, 10:03 AM)                     Ok to stop coumadin 5 days prior, no need to bridge.  thx Opt out

## 2022-02-14 NOTE — PROGRESS NOTES
Bed: 15  Expected date:   Expected time:   Means of arrival:   Comments:  Elton Chacon 97., RN  02/14/22 2830 Daniela Efrain presents today for   Chief Complaint   Patient presents with    Hypertension     1 year follow up     Shortness of Breath     exertion    Dizziness     sometimes        Daniela Zuniga preferred language for health care discussion is english/other. Is someone accompanying this pt? no    Is the patient using any DME equipment during [address]? no    Depression Screening:  3 most recent PHQ Screens 9/5/2019   Little interest or pleasure in doing things Not at all   Feeling down, depressed, irritable, or hopeless Not at all   Total Score PHQ 2 0       Learning Assessment:  Learning Assessment 9/5/2019   PRIMARY LEARNER Patient   HIGHEST LEVEL OF EDUCATION - PRIMARY LEARNER  -   BARRIERS PRIMARY LEARNER -   [address] [facility]    NEED -   LEARNER PREFERENCE PRIMARY DEMONSTRATION   ANSWERED BY Patient   RELATIONSHIP SELF       Abuse Screening:  Abuse Screening Questionnaire 9/5/2019   Do you ever feel afraid of your partner? N   Are you in a relationship with someone who physically or mentally threatens you? N   Is it safe for you to go home? Y       Fall Risk  Fall Risk Assessment, last 12 mths 9/5/2019   Able to walk? Yes   Fall in past 12 months? No       Pt currently taking Anticoagulant therapy? ASA 81mg every day and Warfarin every day     Coordination of Care:  1. Have you been to the ER, urgent care clinic since your last visit? Hospitalized since your last visit? no    2. Have you seen or consulted any other health care providers outside of the [address] [place] [address] since your last visit? Include any pap smears or colon screening.  no

## 2022-03-18 PROBLEM — Z45.09 ENCOUNTER FOR LOOP RECORDER CHECK: Status: ACTIVE | Noted: 2018-04-18

## 2022-09-14 ENCOUNTER — OFFICE VISIT (OUTPATIENT)
Dept: CARDIOLOGY CLINIC | Age: 87
End: 2022-09-14
Payer: MEDICARE

## 2022-09-14 VITALS
HEART RATE: 78 BPM | SYSTOLIC BLOOD PRESSURE: 130 MMHG | BODY MASS INDEX: 21.44 KG/M2 | WEIGHT: 121 LBS | DIASTOLIC BLOOD PRESSURE: 80 MMHG | HEIGHT: 63 IN | OXYGEN SATURATION: 97 %

## 2022-09-14 DIAGNOSIS — R42 VERTIGO: ICD-10-CM

## 2022-09-14 DIAGNOSIS — I10 ESSENTIAL HYPERTENSION: ICD-10-CM

## 2022-09-14 DIAGNOSIS — Z79.01 LONG TERM CURRENT USE OF ANTICOAGULANT THERAPY: ICD-10-CM

## 2022-09-14 DIAGNOSIS — I48.0 PAROXYSMAL ATRIAL FIBRILLATION (HCC): Primary | ICD-10-CM

## 2022-09-14 DIAGNOSIS — E78.5 DYSLIPIDEMIA: ICD-10-CM

## 2022-09-14 PROCEDURE — 93000 ELECTROCARDIOGRAM COMPLETE: CPT | Performed by: INTERNAL MEDICINE

## 2022-09-14 PROCEDURE — G8432 DEP SCR NOT DOC, RNG: HCPCS | Performed by: INTERNAL MEDICINE

## 2022-09-14 PROCEDURE — 1090F PRES/ABSN URINE INCON ASSESS: CPT | Performed by: INTERNAL MEDICINE

## 2022-09-14 PROCEDURE — 99214 OFFICE O/P EST MOD 30 MIN: CPT | Performed by: INTERNAL MEDICINE

## 2022-09-14 PROCEDURE — G8536 NO DOC ELDER MAL SCRN: HCPCS | Performed by: INTERNAL MEDICINE

## 2022-09-14 PROCEDURE — G8420 CALC BMI NORM PARAMETERS: HCPCS | Performed by: INTERNAL MEDICINE

## 2022-09-14 PROCEDURE — G8427 DOCREV CUR MEDS BY ELIG CLIN: HCPCS | Performed by: INTERNAL MEDICINE

## 2022-09-14 PROCEDURE — 1123F ACP DISCUSS/DSCN MKR DOCD: CPT | Performed by: INTERNAL MEDICINE

## 2022-09-14 PROCEDURE — 1101F PT FALLS ASSESS-DOCD LE1/YR: CPT | Performed by: INTERNAL MEDICINE

## 2022-09-14 RX ORDER — RIVAROXABAN 20 MG/1
20 TABLET, FILM COATED ORAL DAILY
COMMUNITY
Start: 2022-06-20

## 2022-09-14 RX ORDER — AMLODIPINE BESYLATE 5 MG/1
5 TABLET ORAL DAILY
COMMUNITY
Start: 2022-06-20

## 2022-09-14 NOTE — PROGRESS NOTES
History of Present Illness:  80 YOF here for follow up. She had been staying in [place] with her daughter, but now she has moved back to just outside [place], about a three hour drive. She has a PCP there and she has been switched from Eliquis to Xarelto. Only some mild nuisance bleeding. Her two sons come and help out outside the house and she takes care of everything inside the house. Despite being 80, she has done remarkably well and very independent. No new chest pain, dyspnea, PND, orthopnea or edema. Impression:  History of paroxysmal a-fib diagnosed about 27 years ago at [facility], previous loop explant November 2018. She had been on Coumadin and Eliquis, now Xarelto. 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 HTN with white coat syndrome, controlled BP at home. History of vertigo and balance issues, seen by ENT, resolved. Dyslipidemia. Plan:  She is doing quite well, living in [place] without any issues and she is very independent. Will continue Xarelto long term. All questions answered and I will see back in a year. Past Medical History:   Diagnosis Date    History of echocardiogram 04/25/2015    EF 55-60%. No WMA. Gr 1 DDfx. RVSP 30-35 mmHg. Mild-mod TR. 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 Vertigo        Current Outpatient Medications   Medication Sig Dispense Refill    apixaban (Eliquis) 5 mg tablet Take 5 mg by mouth two (2) times a day. lisinopriL (PRINIVIL, ZESTRIL) 40 mg tablet TAKE 1 TABLET BY MOUTH DAILY. 90 Tablet 3    hydroCHLOROthiazide (HYDRODIURIL) 25 mg tablet TAKE 1 TABLET BY MOUTH DAILY 90 Tablet 3    diazePAM (VALIUM) 5 mg tablet 1 tablet 1 hour prior to procedure. 1 Tab 0    aspirin 81 mg chewable tablet Take 81 mg by mouth daily.       meclizine (ANTIVERT) 25 mg chewable tablet Take 25 mg by mouth three (3) times daily as needed. Social History   reports that she has never smoked. She has never used smokeless tobacco.   reports no history of alcohol use. Family History  family history is not on file. Review of Systems  Except as stated above include:  Constitutional: Negative for fever, chills and malaise/fatigue. HEENT: No congestion or recent URI. Gastrointestinal: No nausea, vomiting, abdominal pain, bloody stools. Pulmonary:  Negative except as stated above. Cardiac:  Negative except as stated above. Musculoskeletal: Negative except as stated above. Neurological:  No localized symptoms. Skin:  Negative except as stated above. Psych:  Negative except as stated above. Endocrine:  Negative except as stated above. PHYSICAL EXAM  BP Readings from Last 3 Encounters:   09/15/21 (!) 150/80   09/24/20 (!) 142/92   09/05/19 130/70     Pulse Readings from Last 3 Encounters:   09/15/21 75   09/24/20 80   09/05/19 70     Wt Readings from Last 3 Encounters:   09/15/21 57.2 kg (126 lb)   09/24/20 57.2 kg (126 lb)   09/05/19 57.6 kg (127 lb)     General:   Well developed, well groomed. Head/Neck:   No obvious jugular venous distention     No obvious carotid pulsations. No evidence of xanthelasma. Lungs:   No respiratory distress. Clear bilaterally. Heart:  Regular rate and rhythm. Normal S1/S2. Palpation grossly normal.    No significant murmurs, rubs or gallops. Abdomen:   Non-acute abdomen. No obvious pulsations. Extremities:   Intact peripheral pulses. No significant edema. Neurological:   Alert and oriented to person, place, time. No focal neurological deficit visually. Skin:   No obvious rash    Blood Pressure Metric:  Monitor recommended and adjustments stated if needed.

## 2022-09-14 NOTE — PROGRESS NOTES
Kelley Wilkins presents today for   Chief Complaint   Patient presents with    Follow-up     1 year        Kelley Wilkins preferred language for health care discussion is english/other. Is someone accompanying this pt? no    Is the patient using any DME equipment during [address]? no    Depression Screening:  3 most recent PHQ Screens 9/15/2021   Little interest or pleasure in doing things Not at all   Feeling down, depressed, irritable, or hopeless Not at all   Total Score PHQ 2 0       Learning Assessment:  Learning Assessment 9/24/2020   PRIMARY LEARNER Patient   HIGHEST LEVEL OF EDUCATION - PRIMARY LEARNER  -   BARRIERS PRIMARY LEARNER -   [facility]    NEED -   LEARNER PREFERENCE PRIMARY DEMONSTRATION   ANSWERED BY patient   RELATIONSHIP SELF       Abuse Screening:  Abuse Screening Questionnaire 9/24/2020   Do you ever feel afraid of your partner? N   Are you in a relationship with someone who physically or mentally threatens you? N   Is it safe for you to go home? Y       Fall Risk  Fall Risk Assessment, last 12 mths 9/15/2021   Able to walk? Yes   Fall in past 12 months? 0   Do you feel unsteady? 0   Are you worried about falling 0       Pt currently taking Anticoagulant therapy? ASA 81mg     Coordination of Care:  1. Have you been to the ER, urgent care clinic since your last visit? Hospitalized since your last visit? no    2. Have you seen or consulted any other health care providers outside of the [address] [place] [address] since your last visit? Include any pap smears or colon screening.  no

## 2023-10-19 ENCOUNTER — OFFICE VISIT (OUTPATIENT)
Age: 88
End: 2023-10-19
Payer: COMMERCIAL

## 2023-10-19 VITALS
WEIGHT: 115 LBS | HEART RATE: 78 BPM | SYSTOLIC BLOOD PRESSURE: 138 MMHG | OXYGEN SATURATION: 97 % | DIASTOLIC BLOOD PRESSURE: 84 MMHG | BODY MASS INDEX: 20.37 KG/M2

## 2023-10-19 DIAGNOSIS — I48.0 PAROXYSMAL ATRIAL FIBRILLATION (HCC): Primary | ICD-10-CM

## 2023-10-19 PROCEDURE — G8484 FLU IMMUNIZE NO ADMIN: HCPCS | Performed by: INTERNAL MEDICINE

## 2023-10-19 PROCEDURE — 1123F ACP DISCUSS/DSCN MKR DOCD: CPT | Performed by: INTERNAL MEDICINE

## 2023-10-19 PROCEDURE — G8428 CUR MEDS NOT DOCUMENT: HCPCS | Performed by: INTERNAL MEDICINE

## 2023-10-19 PROCEDURE — 1090F PRES/ABSN URINE INCON ASSESS: CPT | Performed by: INTERNAL MEDICINE

## 2023-10-19 PROCEDURE — 4004F PT TOBACCO SCREEN RCVD TLK: CPT | Performed by: INTERNAL MEDICINE

## 2023-10-19 PROCEDURE — 99214 OFFICE O/P EST MOD 30 MIN: CPT | Performed by: INTERNAL MEDICINE

## 2023-10-19 PROCEDURE — G8420 CALC BMI NORM PARAMETERS: HCPCS | Performed by: INTERNAL MEDICINE

## 2023-10-19 NOTE — PROGRESS NOTES
History of Present Illness:  80 YOM here for follow up. She is staying in [facility] with her daughter as she moved just outside of [place] about four years ago, about a three hour drive away. She has done well over the past year without any new chest pain, dyspnea, PND, orthopnea or edema. No bleeding issues. She is on Xarelto. She has some mild nuisance bruising. Impression:  History of paroxysmal atrial fibrillation diagnosed about 28 years ago at [facility]. Previous loop recorder explant November 2018. She is now on Xarelto without bleeding issues or falls. Remote embolic event with ischemic right finger April 2015, maintained on anticoagulation. History of white coat syndrome. History of vertigo, balance issues, stable without any falls. Dyslipidemia. Plan:  Blood pressure is well controlled. She is on Xarelto long term for embolic event, given high CHADSVASc2 score. All questions answered and I will see her back annually. Wt Readings from Last 3 Encounters:   10/19/23 52.2 kg (115 lb)   09/14/22 54.9 kg (121 lb)   09/15/21 57.2 kg (126 lb)     Past Medical History:   Diagnosis Date    History of echocardiogram 04/25/2015    EF 55-60%. No WMA. Gr 1 DDfx. RVSP 30-35 mmHg. Mild-mod TR. 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 Vertigo        Current Outpatient Medications   Medication Sig Dispense Refill    amLODIPine (NORVASC) 5 MG tablet Take 1 tablet by mouth daily      aspirin 81 MG chewable tablet Take 1 tablet by mouth daily      hydroCHLOROthiazide (HYDRODIURIL) 25 MG tablet TAKE 1 TABLET BY MOUTH DAILY      lisinopril (PRINIVIL;ZESTRIL) 40 MG tablet TAKE 1 TABLET BY MOUTH DAILY.       meclizine (ANTIVERT) 25 MG CHEW Take 1 tablet by mouth 3 times daily as needed      rivaroxaban (XARELTO) 20 MG TABS tablet Take 1 tablet by mouth daily       No

## 2024-06-12 ENCOUNTER — TELEPHONE (OUTPATIENT)
Age: 89
End: 2024-06-12

## 2024-06-12 NOTE — TELEPHONE ENCOUNTER
Patient called stating that she is having upcoming hip replacement on 6/24 with Dr. Diandra Pineda in [place]. She would need to hold Xarelto.      Fax: [phone]

## (undated) DEVICE — SUTURE MCRYL SZ 4 0 L18IN ABSRB VLT PS 1 L24MM 3 8 CIR REV Y682H

## (undated) DEVICE — DRAPE SURG W25XL50IN E OPN CIR BND BG

## (undated) DEVICE — COVADERM: Brand: DEROYAL

## (undated) DEVICE — KENDALL RADIOLUCENT FOAM MONITORING ELECTRODE RECTANGULAR SHAPE: Brand: KENDALL

## (undated) DEVICE — DRAPE,ANGIO,BRACH,STERILE,38X44: Brand: MEDLINE